# Patient Record
Sex: FEMALE | Race: OTHER | HISPANIC OR LATINO | Employment: UNEMPLOYED | URBAN - METROPOLITAN AREA
[De-identification: names, ages, dates, MRNs, and addresses within clinical notes are randomized per-mention and may not be internally consistent; named-entity substitution may affect disease eponyms.]

---

## 2019-03-25 ENCOUNTER — APPOINTMENT (EMERGENCY)
Dept: RADIOLOGY | Facility: HOSPITAL | Age: 29
End: 2019-03-25
Payer: SUBSIDIZED

## 2019-03-25 ENCOUNTER — HOSPITAL ENCOUNTER (EMERGENCY)
Facility: HOSPITAL | Age: 29
Discharge: HOME | End: 2019-03-25
Attending: EMERGENCY MEDICINE | Admitting: EMERGENCY MEDICINE
Payer: SUBSIDIZED

## 2019-03-25 VITALS
DIASTOLIC BLOOD PRESSURE: 77 MMHG | HEART RATE: 94 BPM | TEMPERATURE: 98.3 F | WEIGHT: 191 LBS | SYSTOLIC BLOOD PRESSURE: 130 MMHG | RESPIRATION RATE: 18 BRPM | OXYGEN SATURATION: 100 %

## 2019-03-25 DIAGNOSIS — R07.89 ATYPICAL CHEST PAIN: Primary | ICD-10-CM

## 2019-03-25 DIAGNOSIS — R07.89 CHEST WALL PAIN: ICD-10-CM

## 2019-03-25 LAB
ALBUMIN SERPL BCP-MCNC: 3.7 G/DL (ref 3.5–5)
ALP SERPL-CCNC: 65 U/L (ref 46–116)
ALT SERPL W P-5'-P-CCNC: 54 U/L (ref 12–78)
ANION GAP SERPL CALCULATED.3IONS-SCNC: 8 MMOL/L (ref 4–13)
AST SERPL W P-5'-P-CCNC: 40 U/L (ref 5–45)
BASOPHILS # BLD AUTO: 0.02 THOUSANDS/ΜL (ref 0–0.1)
BASOPHILS NFR BLD AUTO: 0 % (ref 0–1)
BILIRUB SERPL-MCNC: 0.4 MG/DL (ref 0.2–1)
BUN SERPL-MCNC: 18 MG/DL (ref 5–25)
CALCIUM SERPL-MCNC: 9.5 MG/DL (ref 8.3–10.1)
CHLORIDE SERPL-SCNC: 102 MMOL/L (ref 100–108)
CO2 SERPL-SCNC: 27 MMOL/L (ref 21–32)
CREAT SERPL-MCNC: 0.52 MG/DL (ref 0.6–1.3)
DEPRECATED D DIMER PPP: 270 NG/ML (FEU) (ref 190–520)
EOSINOPHIL # BLD AUTO: 0.08 THOUSAND/ΜL (ref 0–0.61)
EOSINOPHIL NFR BLD AUTO: 1 % (ref 0–6)
ERYTHROCYTE [DISTWIDTH] IN BLOOD BY AUTOMATED COUNT: 15 % (ref 11.6–15.1)
GFR SERPL CREATININE-BSD FRML MDRD: 130 ML/MIN/1.73SQ M
GLUCOSE SERPL-MCNC: 89 MG/DL (ref 65–140)
HCT VFR BLD AUTO: 40.8 % (ref 34.8–46.1)
HGB BLD-MCNC: 13.7 G/DL (ref 11.5–15.4)
LYMPHOCYTES # BLD AUTO: 3.62 THOUSANDS/ΜL (ref 0.6–4.47)
LYMPHOCYTES NFR BLD AUTO: 28 % (ref 14–44)
MCH RBC QN AUTO: 26 PG (ref 26.8–34.3)
MCHC RBC AUTO-ENTMCNC: 33.6 G/DL (ref 31.4–37.4)
MCV RBC AUTO: 78 FL (ref 82–98)
MONOCYTES # BLD AUTO: 0.76 THOUSAND/ΜL (ref 0.17–1.22)
MONOCYTES NFR BLD AUTO: 6 % (ref 4–12)
NEUTROPHILS # BLD AUTO: 8.26 THOUSANDS/ΜL (ref 1.85–7.62)
NEUTS SEG NFR BLD AUTO: 65 % (ref 43–75)
PLATELET # BLD AUTO: 412 THOUSANDS/UL (ref 149–390)
PMV BLD AUTO: 9.4 FL (ref 8.9–12.7)
POTASSIUM SERPL-SCNC: 5.4 MMOL/L (ref 3.5–5.3)
PROT SERPL-MCNC: 8.6 G/DL (ref 6.4–8.2)
RBC # BLD AUTO: 5.26 MILLION/UL (ref 3.81–5.12)
SODIUM SERPL-SCNC: 137 MMOL/L (ref 136–145)
TROPONIN I SERPL-MCNC: <0.02 NG/ML
WBC # BLD AUTO: 12.74 THOUSAND/UL (ref 4.31–10.16)

## 2019-03-25 PROCEDURE — 85379 FIBRIN DEGRADATION QUANT: CPT | Performed by: EMERGENCY MEDICINE

## 2019-03-25 PROCEDURE — 85025 COMPLETE CBC W/AUTO DIFF WBC: CPT | Performed by: EMERGENCY MEDICINE

## 2019-03-25 PROCEDURE — 71045 X-RAY EXAM CHEST 1 VIEW: CPT

## 2019-03-25 PROCEDURE — 84484 ASSAY OF TROPONIN QUANT: CPT | Performed by: EMERGENCY MEDICINE

## 2019-03-25 PROCEDURE — 93005 ELECTROCARDIOGRAM TRACING: CPT

## 2019-03-25 PROCEDURE — 99285 EMERGENCY DEPT VISIT HI MDM: CPT

## 2019-03-25 PROCEDURE — 80053 COMPREHEN METABOLIC PANEL: CPT | Performed by: EMERGENCY MEDICINE

## 2019-03-25 PROCEDURE — 36415 COLL VENOUS BLD VENIPUNCTURE: CPT | Performed by: EMERGENCY MEDICINE

## 2019-03-25 PROCEDURE — 96374 THER/PROPH/DIAG INJ IV PUSH: CPT

## 2019-03-25 RX ORDER — TRAMADOL HYDROCHLORIDE 50 MG/1
50 TABLET ORAL ONCE
Status: COMPLETED | OUTPATIENT
Start: 2019-03-25 | End: 2019-03-25

## 2019-03-25 RX ORDER — NAPROXEN 500 MG/1
500 TABLET ORAL 2 TIMES DAILY WITH MEALS
Qty: 8 TABLET | Refills: 0 | Status: SHIPPED | OUTPATIENT
Start: 2019-03-25 | End: 2020-08-18

## 2019-03-25 RX ORDER — TRAMADOL HYDROCHLORIDE 50 MG/1
TABLET ORAL
Qty: 8 TABLET | Refills: 0 | Status: SHIPPED | OUTPATIENT
Start: 2019-03-25 | End: 2020-08-18 | Stop reason: ALTCHOICE

## 2019-03-25 RX ORDER — KETOROLAC TROMETHAMINE 30 MG/ML
30 INJECTION, SOLUTION INTRAMUSCULAR; INTRAVENOUS ONCE
Status: COMPLETED | OUTPATIENT
Start: 2019-03-25 | End: 2019-03-25

## 2019-03-25 RX ADMIN — TRAMADOL HYDROCHLORIDE 50 MG: 50 TABLET, COATED ORAL at 22:54

## 2019-03-25 RX ADMIN — KETOROLAC TROMETHAMINE 30 MG: 30 INJECTION, SOLUTION INTRAMUSCULAR at 22:17

## 2019-03-26 LAB
ATRIAL RATE: 85 BPM
P AXIS: 21 DEGREES
PR INTERVAL: 154 MS
QRS AXIS: 29 DEGREES
QRSD INTERVAL: 76 MS
QT INTERVAL: 376 MS
QTC INTERVAL: 447 MS
T WAVE AXIS: 11 DEGREES
VENTRICULAR RATE: 85 BPM

## 2019-03-26 PROCEDURE — 93010 ELECTROCARDIOGRAM REPORT: CPT | Performed by: INTERNAL MEDICINE

## 2019-03-26 NOTE — ED PROVIDER NOTES
History  Chief Complaint   Patient presents with    Back Pain     Use of  line  Pain right back and right chest for a week  No injury   Sudden onset  No abdominal pain   Denies nausea/vomiting  Pain worse with movement , states she has had a lump right side of neck for 3 years , states was seen here and in her coountry and told " is like a ganglion cyst "    Chest Pain     33 yo female c/o right sided upper chest, shoulder and back pain worsening x 5 days  No cough  No fever but chest area feels "hot"  No vomiting or diarrhea  No sob  No trauma  No travel  Worse with movement  History provided by:  Patient   used: Yes        None       History reviewed  No pertinent past medical history  History reviewed  No pertinent surgical history  History reviewed  No pertinent family history  I have reviewed and agree with the history as documented  Social History     Tobacco Use    Smoking status: Former Smoker    Smokeless tobacco: Never Used   Substance Use Topics    Alcohol use: Not Currently    Drug use: Never        Review of Systems   Constitutional: Negative  Negative for fever  HENT: Negative  Eyes: Negative  Respiratory: Negative  Negative for cough and shortness of breath  Cardiovascular: Positive for chest pain  Negative for leg swelling  Gastrointestinal: Negative  Negative for abdominal pain, diarrhea, nausea and vomiting  Genitourinary: Negative  Negative for dysuria and flank pain  Musculoskeletal: Negative  Negative for back pain and myalgias  Skin: Negative  Negative for rash  Neurological: Negative  Negative for dizziness and headaches  Hematological: Does not bruise/bleed easily  Psychiatric/Behavioral: Negative  All other systems reviewed and are negative  Physical Exam  Physical Exam   Constitutional: She appears well-developed and well-nourished  No distress     HENT:   Head: Normocephalic and atraumatic  Eyes: Pupils are equal, round, and reactive to light  Conjunctivae are normal    Neck: Normal range of motion  Neck supple  Cardiovascular: Normal rate, regular rhythm and normal heart sounds  No murmur heard  Pulmonary/Chest: Effort normal and breath sounds normal  No respiratory distress  She exhibits tenderness  + diffuse right anterior upper chest and back pain and axilla area, no swollen lymph nodes   Abdominal: Soft  Bowel sounds are normal  She exhibits no distension  There is no tenderness  Musculoskeletal: Normal range of motion  She exhibits no edema or deformity  Neurological: She is alert  No cranial nerve deficit  Skin: Skin is warm and dry  No rash noted  She is not diaphoretic  No pallor  Psychiatric: She has a normal mood and affect  Her behavior is normal    Nursing note and vitals reviewed        Vital Signs  ED Triage Vitals [03/25/19 2109]   Temperature Pulse Respirations Blood Pressure SpO2   98 3 °F (36 8 °C) 94 18 130/77 100 %      Temp Source Heart Rate Source Patient Position - Orthostatic VS BP Location FiO2 (%)   Tympanic Monitor Sitting Left arm --      Pain Score       7           Vitals:    03/25/19 2109   BP: 130/77   Pulse: 94   Patient Position - Orthostatic VS: Sitting         Visual Acuity      ED Medications  Medications   ketorolac (TORADOL) injection 30 mg (30 mg Intravenous Given 3/25/19 2217)   traMADol (ULTRAM) tablet 50 mg (50 mg Oral Given 3/25/19 2254)       Diagnostic Studies  Results Reviewed     Procedure Component Value Units Date/Time    Troponin I [[id number]]  (Normal) Collected:  03/25/19 2218    Lab Status:  Final result Specimen:  Blood from Arm, Right Updated:  03/25/19 2243     Troponin I <0 02 ng/mL     Comprehensive metabolic panel [[id number]]  (Abnormal) Collected:  03/25/19 2218    Lab Status:  Final result Specimen:  Blood from Arm, Right Updated:  03/25/19 2241     Sodium 137 mmol/L      Potassium 5 4 mmol/L      Chloride 102 mmol/L      CO2 27 mmol/L      ANION GAP 8 mmol/L      BUN 18 mg/dL      Creatinine 0 52 mg/dL      Glucose 89 mg/dL      Calcium 9 5 mg/dL      AST 40 U/L      ALT 54 U/L      Alkaline Phosphatase 65 U/L      Total Protein 8 6 g/dL      Albumin 3 7 g/dL      Total Bilirubin 0 40 mg/dL      eGFR 130 ml/min/1 73sq m     Narrative:       National Kidney Disease Education Program recommendations are as follows:  GFR calculation is accurate only with a steady state creatinine  Chronic Kidney disease less than 60 ml/min/1 73 sq  meters  Kidney failure less than 15 ml/min/1 73 sq  meters      D-Dimer [[id number]]  (Normal) Collected:  03/25/19 2218    Lab Status:  Final result Specimen:  Blood from Arm, Right Updated:  03/25/19 2236     D-Dimer, Quant 270 ng/ml (FEU)     CBC and differential [[id number]]  (Abnormal) Collected:  03/25/19 2218    Lab Status:  Final result Specimen:  Blood from Arm, Right Updated:  03/25/19 2223     WBC 12 74 Thousand/uL      RBC 5 26 Million/uL      Hemoglobin 13 7 g/dL      Hematocrit 40 8 %      MCV 78 fL      MCH 26 0 pg      MCHC 33 6 g/dL      RDW 15 0 %      MPV 9 4 fL      Platelets 664 Thousands/uL      Neutrophils Relative 65 %      Lymphocytes Relative 28 %      Monocytes Relative 6 %      Eosinophils Relative 1 %      Basophils Relative 0 %      Neutrophils Absolute 8 26 Thousands/µL      Lymphocytes Absolute 3 62 Thousands/µL      Monocytes Absolute 0 76 Thousand/µL      Eosinophils Absolute 0 08 Thousand/µL      Basophils Absolute 0 02 Thousands/µL                  XR chest 1 view portable   ED Interpretation by Vinicio James MD ([id number])   NAD                 Procedures  ECG 12 Lead Documentation  Date/Time: 3/25/2019 10:11 PM  Performed by: Vinicio James MD  Authorized by: Vinicio James MD     Indications / Diagnosis:  Chest pain  ECG reviewed by me, the ED Provider: yes    Patient location:  ED  Previous ECG:     Previous ECG:  Unavailable  Interpretation: Interpretation: normal    Rate:     ECG rate:  85    ECG rate assessment: normal    Rhythm:     Rhythm: sinus rhythm    Ectopy:     Ectopy: none    QRS:     QRS axis:  Normal  Conduction:     Conduction: normal    ST segments:     ST segments:  Normal  T waves:     T waves: normal             Phone Contacts  ED Phone Contact    ED Course               PERC Rule for PE      Most Recent Value   PERC Rule for PE   Age >=50  0 Filed at: 03/25/2019 2248   HR >=100  0 Filed at: 03/25/2019 2248   O2 Sat on room air < 95%  0 Filed at: 03/25/2019 2248   History of PE or DVT  0 Filed at: 03/25/2019 2248   Recent trauma or surgery  0 Filed at: 03/25/2019 2248   Hemoptysis  0 Filed at: 03/25/2019 2248   Exogenous estrogen  0 Filed at: 03/25/2019 2248   Unilateral leg swelling  0 Filed at: 03/25/2019 2248   PERC Rule for PE Results  0 Filed at: 03/25/2019 2248                Wells' Criteria for PE      Most Recent Value   Wells' Criteria for PE   Clinical signs and symptoms of DVT  0 Filed at: 03/25/2019 2248   PE is primary diagnosis or equally likely  0 Filed at: 03/25/2019 2248   HR >100  0 Filed at: 03/25/2019 2248   Immobilization at least 3 days or Surgery in the previous 4 weeks  0 Filed at: 03/25/2019 2248   Previous, objectively diagnosed PE or DVT  0 Filed at: 03/25/2019 2248   Hemoptysis  0 Filed at: 03/25/2019 2248   Malignancy with treatment within 6 months or palliative  0 Filed at: 03/25/2019 2248   Wells' Criteria Total  0 Filed at: 03/25/2019 2248            [facility]  Number of Diagnoses or Management Options  Atypical chest pain:   Chest wall pain:   Diagnosis management comments: Evaluation benign, seems musculoskeletal so will treat pain and advised rest, heating pad, follow up outpt        Disposition  Final diagnoses:   Atypical chest pain   Chest wall pain     Time reflects when diagnosis was documented in both MDM as applicable and the Disposition within this note     Time User Action Codes Description Comment 7/76/9842 87:69 PM Shawna TORRES Add [B28 01] Atypical chest pain     8/98/4299 46:05 PM Shawna TORRES Add [J46 40] Chest wall pain       ED Disposition     ED Disposition Condition Date/Time Comment    Discharge Stable Mon Mar 25, 2019 10:49 PM Jimy Mi discharge to home/self care  Follow-up Information     Follow up With Specialties Details Why Contact Info    Gavino Guzman MD Family Medicine Schedule an appointment as soon as possible for a visit   [address] [place] [address]  [facility] [phone]            Patient's Medications   Discharge Prescriptions    NAPROXEN (NAPROSYN) 500 MG TABLET    Take 1 tablet (500 mg total) by mouth 2 (two) times a day with meals       Start Date: 3/25/2019 End Date: --       Order Dose: 500 mg       Quantity: 8 tablet    Refills: 0    TRAMADOL (ULTRAM) 50 MG TABLET    1 q 6 hours prn pain       Start Date: 3/25/2019 End Date: --       Order Dose: --       Quantity: 8 tablet    Refills: 0     No discharge procedures on file      ED Provider  Electronically Signed by           Khris Hong MD  29/08/28 [id number]

## 2019-04-16 ENCOUNTER — OFFICE VISIT (OUTPATIENT)
Dept: INTERNAL MEDICINE CLINIC | Facility: CLINIC | Age: 29
End: 2019-04-16

## 2019-04-16 VITALS
HEART RATE: 80 BPM | HEIGHT: 61 IN | SYSTOLIC BLOOD PRESSURE: 110 MMHG | TEMPERATURE: 98.2 F | BODY MASS INDEX: 36.46 KG/M2 | WEIGHT: 193.12 LBS | DIASTOLIC BLOOD PRESSURE: 80 MMHG

## 2019-04-16 DIAGNOSIS — E78.00 PURE HYPERCHOLESTEROLEMIA: Primary | Chronic | ICD-10-CM

## 2019-04-16 DIAGNOSIS — Z3A.01 LESS THAN 8 WEEKS GESTATION OF PREGNANCY: ICD-10-CM

## 2019-04-16 DIAGNOSIS — E66.09 CLASS 2 OBESITY DUE TO EXCESS CALORIES WITHOUT SERIOUS COMORBIDITY WITH BODY MASS INDEX (BMI) OF 35.0 TO 35.9 IN ADULT: Chronic | ICD-10-CM

## 2019-04-16 DIAGNOSIS — Z23 NEED FOR INFLUENZA VACCINATION: ICD-10-CM

## 2019-04-16 DIAGNOSIS — R79.89 ABNORMAL THYROID BLOOD TEST: Chronic | ICD-10-CM

## 2019-04-16 DIAGNOSIS — G25.2 FINE TREMOR: Chronic | ICD-10-CM

## 2019-04-16 PROCEDURE — 99203 OFFICE O/P NEW LOW 30 MIN: CPT | Performed by: INTERNAL MEDICINE

## 2019-04-16 PROCEDURE — 90682 RIV4 VACC RECOMBINANT DNA IM: CPT | Performed by: INTERNAL MEDICINE

## 2019-04-16 PROCEDURE — 90471 IMMUNIZATION ADMIN: CPT | Performed by: INTERNAL MEDICINE

## 2019-05-02 ENCOUNTER — INITIAL PRENATAL (OUTPATIENT)
Dept: OBGYN CLINIC | Facility: CLINIC | Age: 29
End: 2019-05-02

## 2019-05-02 ENCOUNTER — TRANSCRIBE ORDERS (OUTPATIENT)
Dept: ADMINISTRATIVE | Facility: HOSPITAL | Age: 29
End: 2019-05-02

## 2019-05-02 ENCOUNTER — APPOINTMENT (OUTPATIENT)
Dept: LAB | Facility: HOSPITAL | Age: 29
End: 2019-05-02
Payer: SUBSIDIZED

## 2019-05-02 VITALS
BODY MASS INDEX: 37.19 KG/M2 | HEIGHT: 61 IN | DIASTOLIC BLOOD PRESSURE: 76 MMHG | HEART RATE: 92 BPM | WEIGHT: 197 LBS | SYSTOLIC BLOOD PRESSURE: 108 MMHG

## 2019-05-02 DIAGNOSIS — Z34.91 PRENATAL CARE IN FIRST TRIMESTER: ICD-10-CM

## 2019-05-02 DIAGNOSIS — E78.00 PURE HYPERCHOLESTEROLEMIA: ICD-10-CM

## 2019-05-02 DIAGNOSIS — E78.00 PURE HYPERCHOLESTEROLEMIA: Primary | ICD-10-CM

## 2019-05-02 DIAGNOSIS — Z34.91 PRENATAL CARE IN FIRST TRIMESTER: Primary | ICD-10-CM

## 2019-05-02 LAB
ABO GROUP BLD: NORMAL
ALBUMIN SERPL BCP-MCNC: 3.6 G/DL (ref 3.5–5)
ALP SERPL-CCNC: 52 U/L (ref 46–116)
ALT SERPL W P-5'-P-CCNC: 46 U/L (ref 12–78)
ANION GAP SERPL CALCULATED.3IONS-SCNC: 11 MMOL/L (ref 4–13)
AST SERPL W P-5'-P-CCNC: 23 U/L (ref 5–45)
BACTERIA UR QL AUTO: ABNORMAL /HPF
BASOPHILS # BLD AUTO: 0.03 THOUSANDS/ΜL (ref 0–0.1)
BASOPHILS NFR BLD AUTO: 0 % (ref 0–1)
BILIRUB SERPL-MCNC: 0.2 MG/DL (ref 0.2–1)
BILIRUB UR QL STRIP: NEGATIVE
BLD GP AB SCN SERPL QL: NEGATIVE
BUN SERPL-MCNC: 11 MG/DL (ref 5–25)
CALCIUM SERPL-MCNC: 9.1 MG/DL (ref 8.3–10.1)
CHLORIDE SERPL-SCNC: 101 MMOL/L (ref 100–108)
CLARITY UR: ABNORMAL
CO2 SERPL-SCNC: 25 MMOL/L (ref 21–32)
COLOR UR: YELLOW
CREAT SERPL-MCNC: 0.61 MG/DL (ref 0.6–1.3)
EOSINOPHIL # BLD AUTO: 0.06 THOUSAND/ΜL (ref 0–0.61)
EOSINOPHIL NFR BLD AUTO: 1 % (ref 0–6)
ERYTHROCYTE [DISTWIDTH] IN BLOOD BY AUTOMATED COUNT: 14.6 % (ref 11.6–15.1)
GFR SERPL CREATININE-BSD FRML MDRD: 123 ML/MIN/1.73SQ M
GLUCOSE 1H P 50 G GLC PO SERPL-MCNC: 146 MG/DL
GLUCOSE P FAST SERPL-MCNC: 95 MG/DL (ref 65–99)
GLUCOSE UR STRIP-MCNC: NEGATIVE MG/DL
HBV SURFACE AG SER QL: NORMAL
HCT VFR BLD AUTO: 41.8 % (ref 34.8–46.1)
HGB BLD-MCNC: 13.2 G/DL (ref 11.5–15.4)
HGB UR QL STRIP.AUTO: ABNORMAL
IMM GRANULOCYTES # BLD AUTO: 0.08 THOUSAND/UL (ref 0–0.2)
IMM GRANULOCYTES NFR BLD AUTO: 1 % (ref 0–2)
KETONES UR STRIP-MCNC: NEGATIVE MG/DL
LEUKOCYTE ESTERASE UR QL STRIP: ABNORMAL
LYMPHOCYTES # BLD AUTO: 2.12 THOUSANDS/ΜL (ref 0.6–4.47)
LYMPHOCYTES NFR BLD AUTO: 23 % (ref 14–44)
MCH RBC QN AUTO: 25.8 PG (ref 26.8–34.3)
MCHC RBC AUTO-ENTMCNC: 31.6 G/DL (ref 31.4–37.4)
MCV RBC AUTO: 82 FL (ref 82–98)
MONOCYTES # BLD AUTO: 0.46 THOUSAND/ΜL (ref 0.17–1.22)
MONOCYTES NFR BLD AUTO: 5 % (ref 4–12)
NEUTROPHILS # BLD AUTO: 6.37 THOUSANDS/ΜL (ref 1.85–7.62)
NEUTS SEG NFR BLD AUTO: 70 % (ref 43–75)
NITRITE UR QL STRIP: NEGATIVE
NON-SQ EPI CELLS URNS QL MICRO: ABNORMAL /HPF
NRBC BLD AUTO-RTO: 0 /100 WBCS
PH UR STRIP.AUTO: 6.5 [PH]
PLATELET # BLD AUTO: 392 THOUSANDS/UL (ref 149–390)
PMV BLD AUTO: 9 FL (ref 8.9–12.7)
POTASSIUM SERPL-SCNC: 3.8 MMOL/L (ref 3.5–5.3)
PROT SERPL-MCNC: 7.9 G/DL (ref 6.4–8.2)
PROT UR STRIP-MCNC: NEGATIVE MG/DL
RBC # BLD AUTO: 5.11 MILLION/UL (ref 3.81–5.12)
RBC #/AREA URNS AUTO: ABNORMAL /HPF
RH BLD: POSITIVE
RPR SER QL: NORMAL
RUBV IGG SERPL IA-ACNC: 37.1 IU/ML
SODIUM SERPL-SCNC: 137 MMOL/L (ref 136–145)
SP GR UR STRIP.AUTO: 1.01 (ref 1–1.03)
SPECIMEN EXPIRATION DATE: NORMAL
T4 FREE SERPL-MCNC: 1.02 NG/DL (ref 0.76–1.46)
TSH SERPL DL<=0.05 MIU/L-ACNC: 2.9 UIU/ML (ref 0.36–3.74)
UROBILINOGEN UR QL STRIP.AUTO: 0.2 E.U./DL
WBC # BLD AUTO: 9.12 THOUSAND/UL (ref 4.31–10.16)
WBC #/AREA URNS AUTO: ABNORMAL /HPF

## 2019-05-02 PROCEDURE — 36415 COLL VENOUS BLD VENIPUNCTURE: CPT | Performed by: NURSE PRACTITIONER

## 2019-05-02 PROCEDURE — 84439 ASSAY OF FREE THYROXINE: CPT

## 2019-05-02 PROCEDURE — 82950 GLUCOSE TEST: CPT

## 2019-05-02 PROCEDURE — 81001 URINALYSIS AUTO W/SCOPE: CPT

## 2019-05-02 PROCEDURE — 80081 OBSTETRIC PANEL INC HIV TSTG: CPT

## 2019-05-02 PROCEDURE — 80053 COMPREHEN METABOLIC PANEL: CPT | Performed by: NURSE PRACTITIONER

## 2019-05-02 PROCEDURE — 99211 OFF/OP EST MAY X REQ PHY/QHP: CPT

## 2019-05-02 PROCEDURE — 84443 ASSAY THYROID STIM HORMONE: CPT

## 2019-05-02 PROCEDURE — 87086 URINE CULTURE/COLONY COUNT: CPT

## 2019-05-03 ENCOUNTER — TELEPHONE (OUTPATIENT)
Dept: OBGYN CLINIC | Facility: CLINIC | Age: 29
End: 2019-05-03

## 2019-05-03 DIAGNOSIS — R73.09 ABNORMAL GTT (GLUCOSE TOLERANCE TEST): Primary | ICD-10-CM

## 2019-05-03 LAB — HIV 1+2 AB+HIV1 P24 AG SERPL QL IA: NORMAL

## 2019-05-04 LAB — BACTERIA UR CULT: NORMAL

## 2019-05-06 ENCOUNTER — TRANSCRIBE ORDERS (OUTPATIENT)
Dept: ADMINISTRATIVE | Facility: HOSPITAL | Age: 29
End: 2019-05-06

## 2019-05-06 ENCOUNTER — APPOINTMENT (OUTPATIENT)
Dept: LAB | Facility: HOSPITAL | Age: 29
End: 2019-05-06
Payer: SUBSIDIZED

## 2019-05-06 DIAGNOSIS — R73.09 ABNORMAL GTT (GLUCOSE TOLERANCE TEST): Primary | ICD-10-CM

## 2019-05-06 DIAGNOSIS — R73.09 ABNORMAL GTT (GLUCOSE TOLERANCE TEST): ICD-10-CM

## 2019-05-06 LAB — GLUCOSE P FAST SERPL-MCNC: 103 MG/DL (ref 65–99)

## 2019-05-06 PROCEDURE — 36415 COLL VENOUS BLD VENIPUNCTURE: CPT

## 2019-05-06 PROCEDURE — 82951 GLUCOSE TOLERANCE TEST (GTT): CPT

## 2019-05-09 ENCOUNTER — APPOINTMENT (OUTPATIENT)
Dept: LAB | Facility: HOSPITAL | Age: 29
End: 2019-05-09
Payer: COMMERCIAL

## 2019-05-09 ENCOUNTER — ROUTINE PRENATAL (OUTPATIENT)
Dept: OBGYN CLINIC | Facility: CLINIC | Age: 29
End: 2019-05-09

## 2019-05-09 VITALS
DIASTOLIC BLOOD PRESSURE: 84 MMHG | HEART RATE: 91 BPM | WEIGHT: 198 LBS | SYSTOLIC BLOOD PRESSURE: 126 MMHG | BODY MASS INDEX: 37.41 KG/M2

## 2019-05-09 DIAGNOSIS — Z34.91 PRENATAL CARE IN FIRST TRIMESTER: ICD-10-CM

## 2019-05-09 DIAGNOSIS — O36.80X0 PREGNANCY OF UNKNOWN ANATOMIC LOCATION: Primary | ICD-10-CM

## 2019-05-09 DIAGNOSIS — Z11.3 ROUTINE SCREENING FOR STI (SEXUALLY TRANSMITTED INFECTION): ICD-10-CM

## 2019-05-09 DIAGNOSIS — Z32.01 POSITIVE PREGNANCY TEST: ICD-10-CM

## 2019-05-09 DIAGNOSIS — Z34.91 FIRST TRIMESTER PREGNANCY: ICD-10-CM

## 2019-05-09 LAB — B-HCG SERPL-ACNC: 5236 MIU/ML

## 2019-05-09 PROCEDURE — 36415 COLL VENOUS BLD VENIPUNCTURE: CPT

## 2019-05-09 PROCEDURE — 99213 OFFICE O/P EST LOW 20 MIN: CPT | Performed by: OBSTETRICS & GYNECOLOGY

## 2019-05-09 PROCEDURE — G0145 SCR C/V CYTO,THINLAYER,RESCR: HCPCS | Performed by: OBSTETRICS & GYNECOLOGY

## 2019-05-09 PROCEDURE — 87491 CHLMYD TRACH DNA AMP PROBE: CPT | Performed by: OBSTETRICS & GYNECOLOGY

## 2019-05-09 PROCEDURE — 87591 N.GONORRHOEAE DNA AMP PROB: CPT | Performed by: OBSTETRICS & GYNECOLOGY

## 2019-05-09 PROCEDURE — 84702 CHORIONIC GONADOTROPIN TEST: CPT

## 2019-05-10 ENCOUNTER — OFFICE VISIT (OUTPATIENT)
Dept: PERINATAL CARE | Facility: CLINIC | Age: 29
End: 2019-05-10

## 2019-05-10 VITALS
SYSTOLIC BLOOD PRESSURE: 123 MMHG | HEART RATE: 85 BPM | BODY MASS INDEX: 37.49 KG/M2 | HEIGHT: 61 IN | DIASTOLIC BLOOD PRESSURE: 81 MMHG | WEIGHT: 198.6 LBS

## 2019-05-10 DIAGNOSIS — Z3A.10 10 WEEKS GESTATION OF PREGNANCY: ICD-10-CM

## 2019-05-10 DIAGNOSIS — O99.211 OBESITY AFFECTING PREGNANCY IN FIRST TRIMESTER: ICD-10-CM

## 2019-05-10 DIAGNOSIS — O24.419 GESTATIONAL DIABETES MELLITUS (GDM) IN FIRST TRIMESTER, GESTATIONAL DIABETES METHOD OF CONTROL UNSPECIFIED: Primary | ICD-10-CM

## 2019-05-10 DIAGNOSIS — R73.09 ABNORMAL GTT (GLUCOSE TOLERANCE TEST): ICD-10-CM

## 2019-05-10 LAB
C TRACH DNA SPEC QL NAA+PROBE: NEGATIVE
N GONORRHOEA DNA SPEC QL NAA+PROBE: NEGATIVE

## 2019-05-10 PROCEDURE — G0108 DIAB MANAGE TRN  PER INDIV: HCPCS

## 2019-05-10 RX ORDER — BLOOD SUGAR DIAGNOSTIC
STRIP MISCELLANEOUS
Qty: 100 EACH | Refills: 6 | Status: SHIPPED | OUTPATIENT
Start: 2019-05-10 | End: 2020-08-18 | Stop reason: ALTCHOICE

## 2019-05-10 RX ORDER — LANCETS
EACH MISCELLANEOUS
Qty: 102 EACH | Refills: 6 | Status: SHIPPED | OUTPATIENT
Start: 2019-05-10 | End: 2020-08-18 | Stop reason: ALTCHOICE

## 2019-05-11 ENCOUNTER — TRANSCRIBE ORDERS (OUTPATIENT)
Dept: ADMINISTRATIVE | Facility: HOSPITAL | Age: 29
End: 2019-05-11

## 2019-05-11 ENCOUNTER — LAB (OUTPATIENT)
Dept: LAB | Facility: HOSPITAL | Age: 29
End: 2019-05-11
Payer: SUBSIDIZED

## 2019-05-11 DIAGNOSIS — O36.80X0 PREGNANCY OF UNKNOWN ANATOMIC LOCATION: ICD-10-CM

## 2019-05-11 DIAGNOSIS — O24.419 GESTATIONAL DIABETES MELLITUS (GDM) IN FIRST TRIMESTER, GESTATIONAL DIABETES METHOD OF CONTROL UNSPECIFIED: ICD-10-CM

## 2019-05-11 DIAGNOSIS — Z32.01 PREGNANCY EXAMINATION OR TEST, POSITIVE RESULT: Primary | ICD-10-CM

## 2019-05-11 LAB
B-HCG SERPL-ACNC: 3947 MIU/ML
CHOLEST SERPL-MCNC: 208 MG/DL (ref 50–200)
EST. AVERAGE GLUCOSE BLD GHB EST-MCNC: 131 MG/DL
HBA1C MFR BLD: 6.2 % (ref 4.2–6.3)
HDLC SERPL-MCNC: 40 MG/DL (ref 40–60)
LDLC SERPL CALC-MCNC: 142 MG/DL (ref 0–100)
TRIGL SERPL-MCNC: 129 MG/DL

## 2019-05-11 PROCEDURE — 36415 COLL VENOUS BLD VENIPUNCTURE: CPT

## 2019-05-11 PROCEDURE — 84702 CHORIONIC GONADOTROPIN TEST: CPT

## 2019-05-11 PROCEDURE — 83036 HEMOGLOBIN GLYCOSYLATED A1C: CPT

## 2019-05-11 PROCEDURE — 80061 LIPID PANEL: CPT | Performed by: PHYSICIAN ASSISTANT

## 2019-05-13 ENCOUNTER — TELEPHONE (OUTPATIENT)
Dept: OBGYN CLINIC | Facility: CLINIC | Age: 29
End: 2019-05-13

## 2019-05-13 ENCOUNTER — DOCUMENTATION (OUTPATIENT)
Dept: PERINATAL CARE | Facility: CLINIC | Age: 29
End: 2019-05-13

## 2019-05-14 ENCOUNTER — OFFICE VISIT (OUTPATIENT)
Dept: PERINATAL CARE | Facility: CLINIC | Age: 29
End: 2019-05-14

## 2019-05-14 VITALS
SYSTOLIC BLOOD PRESSURE: 134 MMHG | HEART RATE: 118 BPM | BODY MASS INDEX: 37.12 KG/M2 | DIASTOLIC BLOOD PRESSURE: 87 MMHG | HEIGHT: 61 IN | WEIGHT: 196.6 LBS

## 2019-05-14 DIAGNOSIS — Z3A.10 10 WEEKS GESTATION OF PREGNANCY: ICD-10-CM

## 2019-05-14 DIAGNOSIS — R73.09 ABNORMAL GLUCOSE: ICD-10-CM

## 2019-05-14 DIAGNOSIS — R73.09 ELEVATED HEMOGLOBIN A1C: ICD-10-CM

## 2019-05-14 DIAGNOSIS — R73.03 PRE-DIABETES: Primary | ICD-10-CM

## 2019-05-14 PROBLEM — E66.09 CLASS 2 OBESITY DUE TO EXCESS CALORIES WITHOUT SERIOUS COMORBIDITY WITH BODY MASS INDEX (BMI) OF 36.0 TO 36.9 IN ADULT: Status: ACTIVE | Noted: 2019-04-16

## 2019-05-15 ENCOUNTER — HOSPITAL ENCOUNTER (EMERGENCY)
Facility: HOSPITAL | Age: 29
Discharge: HOME | End: 2019-05-16
Attending: EMERGENCY MEDICINE | Admitting: EMERGENCY MEDICINE
Payer: SUBSIDIZED

## 2019-05-15 DIAGNOSIS — O20.0 THREATENED ABORTION: Primary | ICD-10-CM

## 2019-05-15 LAB
LAB AP GYN PRIMARY INTERPRETATION: NORMAL
LAB AP LMP: NORMAL
Lab: NORMAL

## 2019-05-15 PROCEDURE — 99284 EMERGENCY DEPT VISIT MOD MDM: CPT

## 2019-05-16 ENCOUNTER — OFFICE VISIT (OUTPATIENT)
Dept: OBGYN CLINIC | Facility: CLINIC | Age: 29
End: 2019-05-16

## 2019-05-16 ENCOUNTER — APPOINTMENT (EMERGENCY)
Dept: RADIOLOGY | Facility: HOSPITAL | Age: 29
End: 2019-05-16
Payer: SUBSIDIZED

## 2019-05-16 ENCOUNTER — TELEPHONE (OUTPATIENT)
Dept: OTHER | Facility: OTHER | Age: 29
End: 2019-05-16

## 2019-05-16 VITALS
RESPIRATION RATE: 17 BRPM | OXYGEN SATURATION: 99 % | TEMPERATURE: 98.6 F | HEART RATE: 85 BPM | SYSTOLIC BLOOD PRESSURE: 112 MMHG | DIASTOLIC BLOOD PRESSURE: 56 MMHG

## 2019-05-16 VITALS
DIASTOLIC BLOOD PRESSURE: 86 MMHG | RESPIRATION RATE: 16 BRPM | BODY MASS INDEX: 36.81 KG/M2 | HEART RATE: 76 BPM | SYSTOLIC BLOOD PRESSURE: 118 MMHG | WEIGHT: 194.8 LBS

## 2019-05-16 DIAGNOSIS — O02.89 NON-VIABLE PREGNANCY: Primary | ICD-10-CM

## 2019-05-16 LAB — B-HCG SERPL-ACNC: 2184 MIU/ML

## 2019-05-16 PROCEDURE — 36415 COLL VENOUS BLD VENIPUNCTURE: CPT | Performed by: EMERGENCY MEDICINE

## 2019-05-16 PROCEDURE — 76815 OB US LIMITED FETUS(S): CPT

## 2019-05-16 PROCEDURE — 84702 CHORIONIC GONADOTROPIN TEST: CPT | Performed by: EMERGENCY MEDICINE

## 2019-05-16 NOTE — ASSESSMENT & PLAN NOTE
marcecom  #[id number]    Discussed with patient continued decline of beta HCG levels in past week  - [facility] [phone] -> [phone] -> [phone]     - recommended for patient to have weekly quants to follow HCG down to 0  Explained to patient that while the ED ultrasound noted 2 gestational sacs, the decline in her beta HCG levels denote that she is likely undergoing a spontaneous   Explained that she will likely undergo more increased bleeding than she has already had  Patient provided with weekly HCG quant lab orders for the next 2 weeks to monitor her HCG level  Discussed with patient that we would recommend that she and her  utilize a barrier contraception to prevent pregnancy while we are monitoring her HCG level   Explained that majority of SABs in the first trimester occur secondary to

## 2019-05-23 ENCOUNTER — APPOINTMENT (OUTPATIENT)
Dept: LAB | Facility: HOSPITAL | Age: 29
End: 2019-05-23
Attending: EMERGENCY MEDICINE
Payer: SUBSIDIZED

## 2019-05-23 ENCOUNTER — TRANSCRIBE ORDERS (OUTPATIENT)
Dept: ADMINISTRATIVE | Facility: HOSPITAL | Age: 29
End: 2019-05-23

## 2019-05-23 DIAGNOSIS — O20.0 THREATENED ABORTION: ICD-10-CM

## 2019-05-23 DIAGNOSIS — Z32.01 PREGNANCY EXAMINATION OR TEST, POSITIVE RESULT: Primary | ICD-10-CM

## 2019-05-23 LAB — B-HCG SERPL-ACNC: 25 MIU/ML

## 2019-05-23 PROCEDURE — 84702 CHORIONIC GONADOTROPIN TEST: CPT | Performed by: OBSTETRICS & GYNECOLOGY

## 2019-05-23 PROCEDURE — 36415 COLL VENOUS BLD VENIPUNCTURE: CPT | Performed by: OBSTETRICS & GYNECOLOGY

## 2019-05-28 DIAGNOSIS — O02.89 NON-VIABLE PREGNANCY: Primary | ICD-10-CM

## 2019-05-31 ENCOUNTER — LAB (OUTPATIENT)
Dept: LAB | Facility: HOSPITAL | Age: 29
End: 2019-05-31
Payer: SUBSIDIZED

## 2019-05-31 DIAGNOSIS — O02.89 NON-VIABLE PREGNANCY: ICD-10-CM

## 2019-05-31 DIAGNOSIS — Z32.01 PREGNANCY EXAMINATION OR TEST, POSITIVE RESULT: ICD-10-CM

## 2019-05-31 LAB — B-HCG SERPL-ACNC: 2 MIU/ML

## 2019-05-31 PROCEDURE — 84702 CHORIONIC GONADOTROPIN TEST: CPT

## 2019-05-31 PROCEDURE — 36415 COLL VENOUS BLD VENIPUNCTURE: CPT

## 2019-06-06 ENCOUNTER — LAB (OUTPATIENT)
Dept: LAB | Facility: HOSPITAL | Age: 29
End: 2019-06-06
Payer: SUBSIDIZED

## 2019-06-06 DIAGNOSIS — Z32.01 PREGNANCY EXAMINATION OR TEST, POSITIVE RESULT: ICD-10-CM

## 2019-06-06 LAB — B-HCG SERPL-ACNC: <2 MIU/ML

## 2019-06-06 PROCEDURE — 84702 CHORIONIC GONADOTROPIN TEST: CPT

## 2019-06-06 PROCEDURE — 36415 COLL VENOUS BLD VENIPUNCTURE: CPT

## 2019-06-07 ENCOUNTER — TELEPHONE (OUTPATIENT)
Dept: OBGYN CLINIC | Facility: CLINIC | Age: 29
End: 2019-06-07

## 2019-08-09 ENCOUNTER — OFFICE VISIT (OUTPATIENT)
Dept: OBGYN CLINIC | Facility: CLINIC | Age: 29
End: 2019-08-09

## 2019-08-09 VITALS
HEART RATE: 96 BPM | RESPIRATION RATE: 16 BRPM | BODY MASS INDEX: 38.62 KG/M2 | SYSTOLIC BLOOD PRESSURE: 119 MMHG | WEIGHT: 204.4 LBS | DIASTOLIC BLOOD PRESSURE: 86 MMHG

## 2019-08-09 DIAGNOSIS — Z31.69 INFERTILITY COUNSELING: ICD-10-CM

## 2019-08-09 DIAGNOSIS — E66.09 CLASS 2 OBESITY DUE TO EXCESS CALORIES WITHOUT SERIOUS COMORBIDITY WITH BODY MASS INDEX (BMI) OF 36.0 TO 36.9 IN ADULT: ICD-10-CM

## 2019-08-09 DIAGNOSIS — R21 RASH OF GENITAL AREA: ICD-10-CM

## 2019-08-09 DIAGNOSIS — O03.9 MISCARRIAGE: ICD-10-CM

## 2019-08-09 DIAGNOSIS — N92.6 MISSED PERIOD: Primary | ICD-10-CM

## 2019-08-09 LAB — SL AMB POCT URINE HCG: NEGATIVE

## 2019-08-09 PROCEDURE — 81025 URINE PREGNANCY TEST: CPT | Performed by: OBSTETRICS & GYNECOLOGY

## 2019-08-09 PROCEDURE — 99213 OFFICE O/P EST LOW 20 MIN: CPT | Performed by: OBSTETRICS & GYNECOLOGY

## 2019-08-09 NOTE — ASSESSMENT & PLAN NOTE
Pt here s/p miscarriage on 5/16 after 2 5 months of pregnancy  LMP - 6/21   Pt desires to have children

## 2019-08-09 NOTE — PROGRESS NOTES
Lynsey DelgadomikezaReyes 1990 female MRN: [id number]    Family Medicine Follow-up Visit    ASSESSMENT/PLAN  Problem List Items Addressed This Visit        Musculoskeletal and Integument    Rash of genital area     Likely due to "razor bumps"    - Pt advised to avoid shaving            Other    Class 2 obesity due to excess calories without serious comorbidity with body mass index (BMI) of 36 0 to 36 9 in adult     BMI Counseling: Body mass index is 38 62 kg/m²  Discussed the patient's BMI with her  The BMI is above average  BMI counseling and education was provided to the patient  Nutrition recommendations include reducing portion sizes, decreasing overall calorie intake and 3-5 servings of fruits/vegetables daily  Exercise recommendations include moderate aerobic physical activity for 150 minutes/week, vigorous aerobic physical activity for 75 minutes/week and exercising 3-5 times per week  Miscarriage     Pt here s/p miscarriage on  after 2 5 months of pregnancy  LMP -    Pt desires to have children  Missed period - Primary    Relevant Orders    POCT urine HCG (Completed)    Infertility counseling     Pt  for 10 years and lost only pregnancy in may this year  Concerned about not getting pregnant as desired  Pt reports having regular periods, except missed period in May  - Pt reassured   - Pt advised to use the ovulation lobito to determine when she ovulates  - Discussed available workup for both female (ovarian, tubal and mixed) and male causes of infertility  No future appointments  SUBJECTIVE  CC: Follow-up      HPI:  Cy Méndez is a 34 y o  female who presents to discuss pregnancy after miscarriage  Pt is  had a miscarriage on  after 2 months of pregnancy  Patient is  for 10 years,  has no children of his own either    LMP , however UPT today is negative  Pt also reports frequent " bumps" at the suprapubic extending to the vulvovaginal area    Menstrual history:  Menarche at 13 years  Periods are regular last 4-5 days and  Heavy  Medication history:  None  Social history:   Denies alcohol, illicit drug or tobacco use        Review of Systems   Constitutional: Negative for chills and fever  HENT: Negative for congestion  Eyes: Negative for visual disturbance  Respiratory: Negative for shortness of breath and wheezing  Gastrointestinal: Negative for abdominal pain, nausea and vomiting  Genitourinary: Negative for dyspareunia, dysuria, menstrual problem, vaginal bleeding, vaginal discharge and vaginal pain  Musculoskeletal: Negative for arthralgias  Skin: Positive for rash  Neurological: Negative for light-headedness and headaches  Historical Information   The patient history was reviewed as follows:    Past Medical History:   Diagnosis Date    Class 2 obesity due to excess calories without serious comorbidity with body mass index (BMI) of 35 0 to 35 9 in adult 4/16/2019     Past Surgical History:   Procedure Laterality Date    CYST REMOVAL      Vaginal area     Family History   Problem Relation Age of Onset    No Known Problems Mother     No Known Problems Father     No Known Problems Sister     No Known Problems Brother     No Known Problems Brother     No Known Problems Brother     Hyperlipidemia Maternal Grandmother     Hypertension Maternal Aunt       Social History   Social History     Substance and Sexual Activity   Alcohol Use Never    Frequency: Never     Social History     Substance and Sexual Activity   Drug Use Never     Social History     Tobacco Use   Smoking Status Never Smoker   Smokeless Tobacco Never Used       Medications:     Current Outpatient Medications:     ACCU-CHEK FASTCLIX LANCETS MISC, Test 4 times per day  (Patient not taking: Reported on 8/9/2019), Disp: 102 each, Rfl: 6    ACCU-CHEK GUIDE test strip, Test 4 times per day   (Patient not taking: Reported on 8/9/2019), Disp: 100 each, Rfl: 6    naproxen (NAPROSYN) 500 mg tablet, Take 1 tablet (500 mg total) by mouth 2 (two) times a day with meals (Patient not taking: Reported on 5/9/2019), Disp: 8 tablet, Rfl: 0    Prenatal Vit-Fe Fumarate-FA (PRENATAL 1+1 PO), Take by mouth, Disp: , Rfl:     traMADol (ULTRAM) 50 mg tablet, 1 q 6 hours prn pain (Patient not taking: Reported on 5/9/2019), Disp: 8 tablet, Rfl: 0  No Known Allergies    OBJECTIVE    Vitals:   Vitals:    08/09/19 0916   BP: 119/86   BP Location: Left arm   Patient Position: Sitting   Cuff Size: Large   Pulse: 96   Resp: 16   Weight: 92 7 kg (204 lb 6 4 oz)           Physical Exam   Constitutional: She appears well-developed and well-nourished  HENT:   Head: Normocephalic and atraumatic  Eyes: Conjunctivae are normal    Neck: Normal range of motion  Cardiovascular: Normal rate, regular rhythm and normal heart sounds  Pulmonary/Chest: Breath sounds normal    Abdominal: Bowel sounds are normal  There is no tenderness  Genitourinary:         Musculoskeletal: Normal range of motion  Neurological: She is alert  Skin: Skin is warm and dry  Psychiatric: She has a normal mood and affect  Vitals reviewed                   Cassidy Valencia MD, PGY-1  [facility]   8/9/2019

## 2019-08-09 NOTE — ASSESSMENT & PLAN NOTE
BMI Counseling: Body mass index is 38 62 kg/m²  Discussed the patient's BMI with her  The BMI is above average  BMI counseling and education was provided to the patient  Nutrition recommendations include reducing portion sizes, decreasing overall calorie intake and 3-5 servings of fruits/vegetables daily  Exercise recommendations include moderate aerobic physical activity for 150 minutes/week, vigorous aerobic physical activity for 75 minutes/week and exercising 3-5 times per week

## 2019-08-09 NOTE — ASSESSMENT & PLAN NOTE
Pt  for 10 years and lost only pregnancy in may this year  Concerned about not getting pregnant as desired  Pt reports having regular periods, except missed period in May  - Pt reassured   - Pt advised to use the ovulation lobito to determine when she ovulates  - Discussed available workup for both female (ovarian, tubal and mixed) and male causes of infertility

## 2019-12-31 ENCOUNTER — OFFICE VISIT (OUTPATIENT)
Dept: OBGYN CLINIC | Facility: CLINIC | Age: 29
End: 2019-12-31

## 2019-12-31 DIAGNOSIS — Z32.01 POSITIVE PREGNANCY TEST: Primary | ICD-10-CM

## 2019-12-31 PROCEDURE — 99213 OFFICE O/P EST LOW 20 MIN: CPT | Performed by: NURSE PRACTITIONER

## 2019-12-31 RX ORDER — PNV NO.95/FERROUS FUM/FOLIC AC 28MG-0.8MG
1 TABLET ORAL DAILY
Qty: 30 TABLET | Refills: 10 | Status: SHIPPED | OUTPATIENT
Start: 2019-12-31

## 2019-12-31 NOTE — PROGRESS NOTES
Assessment/Plan:         Diagnoses and all orders for this visit:    Positive pregnancy test  -     Prenatal Vit-Fe Fumarate-FA (PRENATAL VITAMIN) 27-0 8 MG TABS; Take 1 tablet by mouth daily  Pt to RTO for viability scan  Reviewed precautions, call with vaginal bleeding, pain        Subjective:      Patient ID: Chris De La Cruz is a 34 y o  female  HPI   [id number] used, 35 yo , now a G2 with missed menses, LMP was 2019  Pt is surprised that she is pregnant  She is happy with positive UPT results  She has some intermittent nausea, she denies any vaginal bleeding, pelvic pain or leaking fluid  She does reports her breasts hurt her  She is not taking PN vitamins  patient had miscarriage on  at 2 5 months of pregnancy, dx with GDM  The following portions of the patient's history were reviewed and updated as appropriate: allergies, current medications, past family history, past medical history, past social history, past surgical history and problem list     Review of Systems   Constitutional: Negative  Respiratory: Negative  Cardiovascular: Negative  Gastrointestinal: Positive for nausea  Genitourinary: Negative  Neurological: Negative  Objective: There were no vitals taken for this visit  Physical Exam   Constitutional: She appears well-nourished  Cardiovascular: Normal rate, regular rhythm and normal heart sounds  Pulmonary/Chest: Effort normal and breath sounds normal    Abdominal: Soft  There is no tenderness  Skin: Skin is warm and dry  Psychiatric: She has a normal mood and affect   Her behavior is normal

## 2020-01-06 ENCOUNTER — HOSPITAL ENCOUNTER (EMERGENCY)
Facility: HOSPITAL | Age: 30
Discharge: HOME | End: 2020-01-06
Attending: EMERGENCY MEDICINE | Admitting: EMERGENCY MEDICINE
Payer: SUBSIDIZED

## 2020-01-06 VITALS
RESPIRATION RATE: 20 BRPM | TEMPERATURE: 99.2 F | HEART RATE: 85 BPM | BODY MASS INDEX: 36.69 KG/M2 | WEIGHT: 191 LBS | SYSTOLIC BLOOD PRESSURE: 123 MMHG | DIASTOLIC BLOOD PRESSURE: 76 MMHG | OXYGEN SATURATION: 100 %

## 2020-01-06 DIAGNOSIS — O03.9 SPONTANEOUS MISCARRIAGE: Primary | ICD-10-CM

## 2020-01-06 LAB
ALBUMIN SERPL BCP-MCNC: 3.7 G/DL (ref 3.5–5)
ALP SERPL-CCNC: 77 U/L (ref 46–116)
ALT SERPL W P-5'-P-CCNC: 52 U/L (ref 12–78)
ANION GAP SERPL CALCULATED.3IONS-SCNC: 9 MMOL/L (ref 4–13)
AST SERPL W P-5'-P-CCNC: 30 U/L (ref 5–45)
B-HCG SERPL-ACNC: 8 MIU/ML
BASOPHILS # BLD AUTO: 0.04 THOUSANDS/ΜL (ref 0–0.1)
BASOPHILS NFR BLD AUTO: 0 % (ref 0–1)
BILIRUB SERPL-MCNC: 0.2 MG/DL (ref 0.2–1)
BUN SERPL-MCNC: 13 MG/DL (ref 5–25)
CALCIUM SERPL-MCNC: 9.4 MG/DL (ref 8.3–10.1)
CHLORIDE SERPL-SCNC: 101 MMOL/L (ref 100–108)
CO2 SERPL-SCNC: 28 MMOL/L (ref 21–32)
CREAT SERPL-MCNC: 0.75 MG/DL (ref 0.6–1.3)
EOSINOPHIL # BLD AUTO: 0.14 THOUSAND/ΜL (ref 0–0.61)
EOSINOPHIL NFR BLD AUTO: 1 % (ref 0–6)
ERYTHROCYTE [DISTWIDTH] IN BLOOD BY AUTOMATED COUNT: 13.5 % (ref 11.6–15.1)
GFR SERPL CREATININE-BSD FRML MDRD: 108 ML/MIN/1.73SQ M
GLUCOSE SERPL-MCNC: 106 MG/DL (ref 65–140)
HCT VFR BLD AUTO: 42.8 % (ref 34.8–46.1)
HGB BLD-MCNC: 13.6 G/DL (ref 11.5–15.4)
IMM GRANULOCYTES # BLD AUTO: 0.06 THOUSAND/UL (ref 0–0.2)
IMM GRANULOCYTES NFR BLD AUTO: 1 % (ref 0–2)
LYMPHOCYTES # BLD AUTO: 3.83 THOUSANDS/ΜL (ref 0.6–4.47)
LYMPHOCYTES NFR BLD AUTO: 34 % (ref 14–44)
MCH RBC QN AUTO: 25.5 PG (ref 26.8–34.3)
MCHC RBC AUTO-ENTMCNC: 31.8 G/DL (ref 31.4–37.4)
MCV RBC AUTO: 80 FL (ref 82–98)
MONOCYTES # BLD AUTO: 0.81 THOUSAND/ΜL (ref 0.17–1.22)
MONOCYTES NFR BLD AUTO: 7 % (ref 4–12)
NEUTROPHILS # BLD AUTO: 6.45 THOUSANDS/ΜL (ref 1.85–7.62)
NEUTS SEG NFR BLD AUTO: 57 % (ref 43–75)
NRBC BLD AUTO-RTO: 0 /100 WBCS
PLATELET # BLD AUTO: 408 THOUSANDS/UL (ref 149–390)
PMV BLD AUTO: 9.3 FL (ref 8.9–12.7)
POTASSIUM SERPL-SCNC: 3.6 MMOL/L (ref 3.5–5.3)
PROT SERPL-MCNC: 8.2 G/DL (ref 6.4–8.2)
RBC # BLD AUTO: 5.34 MILLION/UL (ref 3.81–5.12)
SODIUM SERPL-SCNC: 138 MMOL/L (ref 136–145)
WBC # BLD AUTO: 11.33 THOUSAND/UL (ref 4.31–10.16)

## 2020-01-06 PROCEDURE — 80053 COMPREHEN METABOLIC PANEL: CPT | Performed by: EMERGENCY MEDICINE

## 2020-01-06 PROCEDURE — 84702 CHORIONIC GONADOTROPIN TEST: CPT | Performed by: EMERGENCY MEDICINE

## 2020-01-06 PROCEDURE — 99284 EMERGENCY DEPT VISIT MOD MDM: CPT | Performed by: EMERGENCY MEDICINE

## 2020-01-06 PROCEDURE — 99284 EMERGENCY DEPT VISIT MOD MDM: CPT

## 2020-01-06 PROCEDURE — 85025 COMPLETE CBC W/AUTO DIFF WBC: CPT | Performed by: EMERGENCY MEDICINE

## 2020-01-06 PROCEDURE — 36415 COLL VENOUS BLD VENIPUNCTURE: CPT | Performed by: EMERGENCY MEDICINE

## 2020-01-07 ENCOUNTER — ULTRASOUND (OUTPATIENT)
Dept: OBGYN CLINIC | Facility: CLINIC | Age: 30
End: 2020-01-07

## 2020-01-07 VITALS
SYSTOLIC BLOOD PRESSURE: 108 MMHG | HEART RATE: 85 BPM | DIASTOLIC BLOOD PRESSURE: 77 MMHG | BODY MASS INDEX: 36.88 KG/M2 | WEIGHT: 192 LBS

## 2020-01-07 DIAGNOSIS — Z31.69 INFERTILITY COUNSELING: Primary | ICD-10-CM

## 2020-01-07 DIAGNOSIS — O03.9 MISCARRIAGE: ICD-10-CM

## 2020-01-07 PROCEDURE — 99213 OFFICE O/P EST LOW 20 MIN: CPT | Performed by: OBSTETRICS & GYNECOLOGY

## 2020-01-07 NOTE — PROGRESS NOTES
Sindy EspinozaReyes 1990 female MRN: [id number]    Family Medicine Acute Visit    ASSESSMENT/PLAN  Problem List Items Addressed This Visit        Other    Miscarriage     Patient had positive pregnancy test on 12/31  LMP 11/02  -ED visit yesterday due to vaginal bleeding and cramping    -Urine pregnancy test negative today  -NSAIDs for cramping          Infertility counseling - Primary     Patient has been trying to get pregnant for more than 1 year without contraceptives  -Patient reports regular menstrual cycles  -Hx of miscarriage on June 2019  -Counseled on weight loss, exercise and balanced diet  Patient would like to try also medication to get pregnant  Options reviewed with her  Will start Clomid  -RTC in 3 months for follow up  Relevant Medications    clomiPHENE (CLOMID) 50 mg tablet            No future appointments  SUBJECTIVE  CC: Miscarriage (possible miscarriage ); Vaginal Bleeding; and Dysmenorrhea      HPI:  Michael Covarrubias is a 34 y o  female with obesity who presents for abdominal cramping and vaginal bleeding  Patient had a positive pregnancy test on 12/31/19  She presented to ED yesterday due to pelvic pain and vaginal bleeding  HCG quant 8 on ED  She has moderate VB since yesterday  Today pregnancy test in the office negative  Patient had a miscarriage on  June 2019  She states she desires pregnancy and was previously counseled on diet and exercise  She has regular menstrual cycles every 28-30 days  Review of Systems   Constitutional: Negative for appetite change, diaphoresis, fatigue and fever  HENT: Negative for congestion, rhinorrhea and sinus pain  Respiratory: Negative for cough, chest tightness and shortness of breath  Cardiovascular: Negative for chest pain, palpitations and leg swelling  Gastrointestinal: Positive for abdominal pain (cramping)  Negative for abdominal distention, constipation, diarrhea, nausea and vomiting     Genitourinary: Positive for vaginal bleeding  Negative for difficulty urinating, frequency and urgency  Musculoskeletal: Negative for back pain and neck pain  Neurological: Negative for weakness, light-headedness, numbness and headaches  Psychiatric/Behavioral: Negative for agitation and behavioral problems  The patient is not nervous/anxious  Historical Information   The patient history was reviewed as follows:  Past Medical History:   Diagnosis Date    Class 2 obesity due to excess calories without serious comorbidity with body mass index (BMI) of 35 0 to 35 9 in adult 4/16/2019         Past Surgical History:   Procedure Laterality Date    CYST REMOVAL      Vaginal area     Family History   Problem Relation Age of Onset    No Known Problems Mother     No Known Problems Father     No Known Problems Sister     No Known Problems Brother     No Known Problems Brother     No Known Problems Brother     Hyperlipidemia Maternal Grandmother     Hypertension Maternal Aunt       Social History   Social History     Substance and Sexual Activity   Alcohol Use Never    Frequency: Never     Social History     Substance and Sexual Activity   Drug Use Never     Social History     Tobacco Use   Smoking Status Never Smoker   Smokeless Tobacco Never Used       Medications:     Current Outpatient Medications:     ACCU-CHEK FASTCLIX LANCETS MISC, Test 4 times per day  (Patient not taking: Reported on 8/9/2019), Disp: 102 each, Rfl: 6    ACCU-CHEK GUIDE test strip, Test 4 times per day  (Patient not taking: Reported on 8/9/2019), Disp: 100 each, Rfl: 6    clomiPHENE (CLOMID) 50 mg tablet, Take 1 tablet (50 mg total) by mouth daily Start day 5 of menstrual period  Take 1 tablet of 50 mg daily for 5 days   Repeat for 3 cycles, Disp: 5 tablet, Rfl: 3    naproxen (NAPROSYN) 500 mg tablet, Take 1 tablet (500 mg total) by mouth 2 (two) times a day with meals (Patient not taking: Reported on 5/9/2019), Disp: 8 tablet, Rfl: 0    Prenatal Vit-Fe Fumarate-FA (PRENATAL VITAMIN) 27-0 8 MG TABS, Take 1 tablet by mouth daily, Disp: 30 tablet, Rfl: 10    traMADol (ULTRAM) 50 mg tablet, 1 q 6 hours prn pain (Patient not taking: Reported on 5/9/2019), Disp: 8 tablet, Rfl: 0    No Known Allergies    OBJECTIVE  Vitals:   Vitals:    01/07/20 0829   BP: 108/77   BP Location: Left arm   Patient Position: Sitting   Cuff Size: Adult   Pulse: 85   Weight: 87 1 kg (192 lb)         Physical Exam   Constitutional: She is oriented to person, place, and time  She appears well-developed and well-nourished  No distress  HENT:   Head: Normocephalic and atraumatic  Neck: Normal range of motion  Neck supple  Cardiovascular: Normal rate, regular rhythm, normal heart sounds and intact distal pulses  Exam reveals no gallop and no friction rub  No murmur heard  Pulmonary/Chest: Effort normal and breath sounds normal  No respiratory distress  Abdominal: Soft  Bowel sounds are normal  She exhibits no distension  There is tenderness (mild tenderness to palpation in pelvic area)  There is no guarding  Musculoskeletal: Normal range of motion  She exhibits no edema, tenderness or deformity  Neurological: She is alert and oriented to person, place, and time  Skin: Skin is warm and dry  No rash noted  She is not diaphoretic  No erythema  Psychiatric: She has a normal mood and affect  Her behavior is normal  Judgment and thought content normal    Vitals reviewed                   Crescencio Resendiz MD, PGY-2  [facility]   1/7/2020

## 2020-01-07 NOTE — ASSESSMENT & PLAN NOTE
Patient had positive pregnancy test on 12/31   LMP 11/02  -ED visit yesterday due to vaginal bleeding and cramping    -Urine pregnancy test negative today  -NSAIDs for cramping

## 2020-01-07 NOTE — ED PROVIDER NOTES
History  Chief Complaint   Patient presents with    Vaginal Bleeding - Pregnant     8 weeks pregnant, having some intermittent cramping, bleeding seems to be increasing     Pt is  at approx 8wks, in ER with c/o low abd cramping and vaginal bleeding that began early this morning  She describes the pain as her menses  Pt had a positive preg test on , [facility] -   Pt is primarily Yi speaking, language line used for translation  History provided by:  Patient   used: Yes        Prior to Admission Medications   Prescriptions Last Dose Informant Patient Reported? Taking? ACCU-CHEK FASTCLIX LANCETS MISC  Self No No   Sig: Test 4 times per day  Patient not taking: Reported on 2019   ACCU-CHEK GUIDE test strip  Self No No   Sig: Test 4 times per day     Patient not taking: Reported on 2019   Prenatal Vit-Fe Fumarate-FA (PRENATAL VITAMIN) 27-0 8 MG TABS 2020 at Unknown time  No Yes   Sig: Take 1 tablet by mouth daily   naproxen (NAPROSYN) 500 mg tablet  Self No No   Sig: Take 1 tablet (500 mg total) by mouth 2 (two) times a day with meals   Patient not taking: Reported on 2019   traMADol (ULTRAM) 50 mg tablet  Self No No   Si q 6 hours prn pain   Patient not taking: Reported on 2019      Facility-Administered Medications: None       Past Medical History:   Diagnosis Date    Class 2 obesity due to excess calories without serious comorbidity with body mass index (BMI) of 35 0 to 35 9 in adult 2019       Past Surgical History:   Procedure Laterality Date    CYST REMOVAL      Vaginal area       Family History   Problem Relation Age of Onset    No Known Problems Mother     No Known Problems Father     No Known Problems Sister     No Known Problems Brother     No Known Problems Brother     No Known Problems Brother     Hyperlipidemia Maternal Grandmother     Hypertension Maternal Aunt      I have reviewed and agree with the history as documented  Social History     Tobacco Use    Smoking status: Never Smoker    Smokeless tobacco: Never Used   Substance Use Topics    Alcohol use: Never     Frequency: Never    Drug use: Never        Review of Systems   Constitutional: Negative for chills and fever  Respiratory: Negative for cough, chest tightness and shortness of breath  Gastrointestinal: Positive for abdominal pain  Negative for diarrhea, nausea and vomiting  Genitourinary: Positive for vaginal bleeding  Negative for difficulty urinating, dysuria, frequency, hematuria, urgency and vaginal discharge  Musculoskeletal: Negative for back pain, neck pain and neck stiffness  All other systems reviewed and are negative  Physical Exam  Physical Exam   Constitutional: She is oriented to person, place, and time  She appears well-developed and well-nourished  No distress  HENT:   Head: Normocephalic and atraumatic  Eyes: Pupils are equal, round, and reactive to light  Conjunctivae are normal    Neck: Normal range of motion  Neck supple  Cardiovascular: Normal rate, regular rhythm and normal heart sounds  No murmur heard  Pulmonary/Chest: Effort normal and breath sounds normal  No respiratory distress  Abdominal: Soft  Bowel sounds are normal  She exhibits no distension  There is tenderness  Genitourinary: Pelvic exam was performed with patient supine  There is bleeding in the vagina  Genitourinary Comments: Cervix is closed, moderate vaginal bleeding  No clots noted   Musculoskeletal: Normal range of motion  She exhibits no edema or deformity  Neurological: She is alert and oriented to person, place, and time  No cranial nerve deficit  Skin: Skin is warm and dry  No rash noted  She is not diaphoretic  No pallor  Psychiatric: She has a normal mood and affect  Her behavior is normal    Nursing note and vitals reviewed        Vital Signs  ED Triage Vitals [01/06/20 2047]   Temperature Pulse Respirations Blood Pressure SpO2   99 2 °F (37 3 °C) 85 20 123/76 100 %      Temp Source Heart Rate Source Patient Position - Orthostatic VS BP Location FiO2 (%)   Tympanic Monitor Sitting Right arm --      Pain Score       6           Vitals:    01/06/20 2047   BP: 123/76   Pulse: 85   Patient Position - Orthostatic VS: Sitting         Visual Acuity      ED Medications  Medications - No data to display    Diagnostic Studies  Results Reviewed     Procedure Component Value Units Date/Time    Comprehensive metabolic panel [[id number]] Collected:  01/06/20 2137    Lab Status:  Final result Specimen:  Blood from Arm, Right Updated:  01/06/20 2203     Sodium 138 mmol/L      Potassium 3 6 mmol/L      Chloride 101 mmol/L      CO2 28 mmol/L      ANION GAP 9 mmol/L      BUN 13 mg/dL      Creatinine 0 75 mg/dL      Glucose 106 mg/dL      Calcium 9 4 mg/dL      AST 30 U/L      ALT 52 U/L      Alkaline Phosphatase 77 U/L      Total Protein 8 2 g/dL      Albumin 3 7 g/dL      Total Bilirubin 0 20 mg/dL      eGFR 108 ml/min/1 73sq m     Narrative:       [place] guidelines for Chronic Kidney Disease (CKD):     Stage 1 with normal or high GFR (GFR > 90 mL/min/1 73 square meters)    Stage 2 Mild CKD (GFR = 60-89 mL/min/1 73 square meters)    Stage 3A Moderate CKD (GFR = 45-59 mL/min/1 73 square meters)    Stage 3B Moderate CKD (GFR = 30-44 mL/min/1 73 square meters)    Stage 4 Severe CKD (GFR = 15-29 mL/min/1 73 square meters)    Stage 5 End Stage CKD (GFR <15 mL/min/1 73 square meters)  Note: GFR calculation is accurate only with a steady state creatinine    Quantitative hCG [[id number]]  (Abnormal) Collected:  01/06/20 2137    Lab Status:  Final result Specimen:  Blood from Arm, Right Updated:  01/06/20 2203     HCG, Quant 8 mIU/mL     Narrative:        Expected Ranges:     Approximate               Approximate HCG  Gestation age          Concentration ( mIU/mL)  _____________          ______________________   Bertha Donahue HCG values  0 2-1                       5-50  1-2                           2-3                         100-5000  3-4                         500-51133  4-5                         1000-98967  5-6                         [id number]  6-8                         13761-389123  8-12                        50075-047569      CBC and differential [[id number]]  (Abnormal) Collected:  01/06/20 2137    Lab Status:  Final result Specimen:  Blood from Arm, Right Updated:  01/06/20 2142     WBC 11 33 Thousand/uL      RBC 5 34 Million/uL      Hemoglobin 13 6 g/dL      Hematocrit 42 8 %      MCV 80 fL      MCH 25 5 pg      MCHC 31 8 g/dL      RDW 13 5 %      MPV 9 3 fL      Platelets 561 Thousands/uL      nRBC 0 /100 WBCs      Neutrophils Relative 57 %      Immat GRANS % 1 %      Lymphocytes Relative 34 %      Monocytes Relative 7 %      Eosinophils Relative 1 %      Basophils Relative 0 %      Neutrophils Absolute 6 45 Thousands/µL      Immature Grans Absolute 0 06 Thousand/uL      Lymphocytes Absolute 3 83 Thousands/µL      Monocytes Absolute 0 81 Thousand/µL      Eosinophils Absolute 0 14 Thousand/µL      Basophils Absolute 0 04 Thousands/µL                  No orders to display              Procedures  Procedures         ED Course                               MDM  Number of Diagnoses or Management Options  Spontaneous miscarriage:   Diagnosis management comments: Pt with positive preg test on 12/31, now with hcg of 8  Pt to f/u with PCP and OB/GYN for repeat hcg  Labs reviewed - AB positive, Ab neg from may 2019          Amount and/or Complexity of Data Reviewed  Clinical lab tests: ordered and reviewed    Risk of Complications, Morbidity, and/or Mortality  Presenting problems: moderate  Diagnostic procedures: moderate  Management options: moderate    Patient Progress  Patient progress: stable        Disposition  Final diagnoses:   Spontaneous miscarriage     Time reflects when diagnosis was documented in both MDM as applicable and the Disposition within this note     Time User Action Codes Description Comment    1/6/2020 10:44 PM Santa Greene Add [O03 9] Spontaneous miscarriage       ED Disposition     ED Disposition Condition Date/Time Comment    Discharge Stable Mon Jan 6, 2020 10:44 PM Sindy EspinozaReyes discharge to home/self care  Follow-up Information     Follow up With Specialties Details Why Contact Info    Amber Young MD Obstetrics and Gynecology, Obstetrics, Gynecology Schedule an appointment as soon as possible for a visit in 2 days for follow up and for repeat hcg [address]  [phone]            Discharge Medication List as of 1/6/2020 10:46 PM      CONTINUE these medications which have NOT CHANGED    Details   Prenatal Vit-Fe Fumarate-FA (PRENATAL VITAMIN) 27-0 8 MG TABS Take 1 tablet by mouth daily, Starting Tue 12/31/2019, Normal      ACCU-CHEK FASTCLIX LANCETS MISC Test 4 times per day , Normal      ACCU-CHEK GUIDE test strip Test 4 times per day , Normal      naproxen (NAPROSYN) 500 mg tablet Take 1 tablet (500 mg total) by mouth 2 (two) times a day with meals, Starting Mon 3/25/2019, Print      traMADol (ULTRAM) 50 mg tablet 1 q 6 hours prn pain, Print           No discharge procedures on file      ED Provider  Electronically Signed by           Omar Martinez DO  01/07/20 6107

## 2020-01-07 NOTE — DISCHARGE INSTRUCTIONS
Follow up with your primary care physician and Dr Raphael Ramirez  Return to the ER for worsening pain/cramping

## 2020-01-07 NOTE — ASSESSMENT & PLAN NOTE
Patient has been trying to get pregnant for more than 1 year without contraceptives  -Patient reports regular menstrual cycles  -Hx of miscarriage on June 2019  -Counseled on weight loss, exercise and balanced diet  Patient would like to try also medication to get pregnant  Options reviewed with her  Will start Clomid  -RTC in 3 months for follow up

## 2020-08-13 ENCOUNTER — ULTRASOUND (OUTPATIENT)
Dept: OBGYN CLINIC | Facility: CLINIC | Age: 30
End: 2020-08-13

## 2020-08-13 VITALS
HEART RATE: 90 BPM | WEIGHT: 190.8 LBS | DIASTOLIC BLOOD PRESSURE: 82 MMHG | HEIGHT: 61 IN | SYSTOLIC BLOOD PRESSURE: 138 MMHG | TEMPERATURE: 98.4 F | RESPIRATION RATE: 16 BRPM | BODY MASS INDEX: 36.02 KG/M2

## 2020-08-13 DIAGNOSIS — N91.2 AMENORRHEA: ICD-10-CM

## 2020-08-13 DIAGNOSIS — K59.00 CONSTIPATION DURING PREGNANCY IN FIRST TRIMESTER: Primary | ICD-10-CM

## 2020-08-13 DIAGNOSIS — O99.611 CONSTIPATION DURING PREGNANCY IN FIRST TRIMESTER: Primary | ICD-10-CM

## 2020-08-13 PROCEDURE — 99213 OFFICE O/P EST LOW 20 MIN: CPT | Performed by: OBSTETRICS & GYNECOLOGY

## 2020-08-13 PROCEDURE — 1036F TOBACCO NON-USER: CPT | Performed by: OBSTETRICS & GYNECOLOGY

## 2020-08-13 RX ORDER — DOCUSATE SODIUM 100 MG/1
100 CAPSULE, LIQUID FILLED ORAL 2 TIMES DAILY
Qty: 60 CAPSULE | Refills: 2 | Status: SHIPPED | OUTPATIENT
Start: 2020-08-13 | End: 2020-09-16 | Stop reason: ALTCHOICE

## 2020-08-13 NOTE — PROGRESS NOTES
Subjective:     Kai Murphy is a 27 y o   female who presents with acute onset amenorrhea with and LMP of 20 and a positive home pregnancy test     Objective:    Vitals: Blood pressure 138/82, pulse 90, temperature 98 4 °F (36 9 °C), resp  rate 16, height 5' 1" (1 549 m), weight 86 5 kg (190 lb 12 8 oz), last menstrual period 2020, unknown if currently breastfeeding  Body mass index is 36 05 kg/m²  FIRST TRIMESTER OBSTETRIC ULTRASOUND  2020  Monica Wilson MD     INDICATION: Amenorrhea, viability    COMPARISON: None  TECHNIQUE:   Transvaginal imaging was performed to assess the gestation, myometrial/endometrial architecture and ovarian parenchymal detail  The study includes volumetric sweeps and traditional still imaging technique  FINDINGS:     A single intrauterine gestation is identified  Cardiac activity is detected at 168bpm       YOLK SAC:  Present and normal in size and appearance  MEAN CROWN RUMP LENGTH:  18 7 mm = 8 weeks 3 days   AMNIOTIC FLUID/SAC SHAPE:  Within expected normal range  UTERUS/ADNEXA:   No adnexal mass or pathologic cyst   No free fluid identified  IMPRESSION:     Single intrauterine pregnancy of 8 weeks 3 days gestational age  Fetal cardiac activity detected  No adnexal masses seen         Assessment/Plan:    1) [place] IUP measuring 8 weeks 3 days gestation w/cardiac activity   -RTO for nursing intake and Prenatal H&P        Monica Wilson MD  2020  10:26 AM

## 2020-08-17 NOTE — PROGRESS NOTES
OB INTAKE INTERVIEW  Pt presents for OB intake  French   [id number] used  LMP 2020 the with NEENA of 2021  The patient had ultrasound done 2020 at 8 weeks and 3 days with NEENA of 2021  NEENA set for 2021  patient with history of 2 pregnancy losses, one   On  at 11w1d  weeks, and another on  2020 at 8 wks  Currently she denies any kind leakage of fluid, bleeding or cramping, she does report lower back pain  history of gestational diabetes      OB History    Para Term  AB Living   3 0 0 0 2 0   SAB TAB Ectopic Multiple Live Births   2 0 0   0      # Outcome Date GA Lbr Patricio/2nd Weight Sex Delivery Anes PTL Lv   3 Current            2 SAB 2020 8w0d          1 SAB 19 11w1d            Hx of  delivery prior to 36 weeks 6 days:  No   If yes, place a referral for cervical surveillance at 16 weeks  Last Menstrual Period:    Patient's last menstrual period was 2020 (exact date)  Ultrasound date:  Patient is schedule sequential screen    Estimated Date of Delivery: 3/23/21   by LMP, H&P visit scheduled  2020    Last pap smear: 2019 negative  Current Issues:  Constipation :   yes  Headaches :   Yes  Cramping:  No  Spotting :   No  PICA cravings :  No  FOB Involved:   Yes, Ant  Planned pregnancy:  No  I have these concerns about this prenatal patient:    history of 2 losses   history of gestational diabetes- will check early 1 hour GTT    Interview education   [facility] Pregnancy Essentials reviewed-in my chart    Baby and [address] Handout   [facility] for [facility]   Discussed genetic testing- sequential screen referral    Prenatal lab work: Scripts for prenatal panel, 1 hr GTT    referral- depression screen positive   WIC letter in chart   Work note in chart   Proof of pregnancy letter in chart    Influenza vaccine given today: No   Discussed Tdap vaccine 27-36 wks  Immunizations:   Immunization History   Administered Date(s) Administered    Influenza, recombinant, quadrivalent,injectable, preservative free 04/16/2019     Depression Screening Follow-up Plan: Patient's depression screening was positive with an Ramsey score of  10  Patient assessed for underlying major depression  They have no active suicidal ideations  Brief counseling provided and recommend additional follow-up/re-evaluation next office visit     Nurse/Family Partnership- referral placed:  No   If yes, place referral for nurse family partnership  BMI Counseling: There is no height or weight on file to calculate BMI  is above normal  Nutrition recommendations include reducing portion sizes, moderation in carbohydrate intake and increasing intake of lean protein  Reviewed total pregnancy weight gain of 11-20 lb  Tobacco Cessation Counseling: non-smoker  Tobacco cessation counseling was not provided  Infection Screening: Does the pt have a hx of MRSA? No  If yes- please follow MRSA protocol and obtain a nasal swab for MRSA culture  The patient was oriented to our practice and all questions were answered  Interviewed by: JR Mata 08/18/20  Virtual Regular Visit      Assessment/Plan:    Problem List Items Addressed This Visit     None      Visit Diagnoses     First trimester pregnancy    -  Primary               Reason for visit is   Chief Complaint   Patient presents with    Virtual Regular Visit        Encounter provider JR Mata    Provider located at [address] [place] [address]  [phone]      Recent Visits  No visits were found meeting these conditions     Showing recent visits within past 7 days and meeting all other requirements     Today's Visits  Date Type Provider Dept   08/18/20 Telemedicine Blake Mata 116 today's visits and meeting all other requirements Future Appointments  No visits were found meeting these conditions  Showing future appointments within next 150 days and meeting all other requirements        The patient was identified by name and date of birth  Sindy EspinozaReyes was informed that this is a telemedicine visit and that the visit is being conducted through [employer]  My office door was closed  No one else was in the room  She acknowledged consent and understanding of privacy and security of the video platform  The patient has agreed to participate and understands they can discontinue the visit at any time  Patient is aware this is a billable service  Subjective  Monalisa Gomez is a 27 y o  female    HPI  25-year-old  020 here for OB intake  Patient is [place]ian-speaking only  Patient was 2 previous pregnancy losses  Patient with history of gestational diabetes  Past Medical History:   Diagnosis Date    Class 2 obesity due to excess calories without serious comorbidity with body mass index (BMI) of 35 0 to 35 9 in adult 2019    Fibroid     Gestational diabetes     Miscarriage     Recurrent pregnancy loss, antepartum condition or complication     Varicella     disease       Past Surgical History:   Procedure Laterality Date    CYST REMOVAL      Vaginal area       Current Outpatient Medications   Medication Sig Dispense Refill    Prenatal Vit-Fe Fumarate-FA (PRENATAL VITAMIN) 27-0 8 MG TABS Take 1 tablet by mouth daily 30 tablet 10    docusate sodium (COLACE) 100 mg capsule Take 1 capsule (100 mg total) by mouth 2 (two) times a day (Patient not taking: Reported on 2020) 60 capsule 2     No current facility-administered medications for this visit  No Known Allergies    Review of Systems    Video Exam    There were no vitals filed for this visit      Physical Exam     I spent 60 minutes directly with the patient during this visit      VIRTUAL VISIT DISCLAIMER    Sindy EspinozaReyes acknowledges that she has consented to an online visit or consultation  She understands that the online visit is based solely on information provided by her, and that, in the absence of a face-to-face physical evaluation by the physician, the diagnosis she receives is both limited and provisional in terms of accuracy and completeness  This is not intended to replace a full medical face-to-face evaluation by the physician  Sindy EspinozaReyes understands and accepts these terms

## 2020-08-18 ENCOUNTER — TELEMEDICINE (OUTPATIENT)
Dept: OBGYN CLINIC | Facility: CLINIC | Age: 30
End: 2020-08-18

## 2020-08-18 DIAGNOSIS — Z34.91 FIRST TRIMESTER PREGNANCY: Primary | ICD-10-CM

## 2020-08-18 DIAGNOSIS — Z13.31 SCREENING FOR DEPRESSION: ICD-10-CM

## 2020-08-18 DIAGNOSIS — O09.291 HISTORY OF GESTATIONAL DIABETES IN PRIOR PREGNANCY, CURRENTLY PREGNANT IN FIRST TRIMESTER: ICD-10-CM

## 2020-08-18 DIAGNOSIS — Z86.32 HISTORY OF GESTATIONAL DIABETES IN PRIOR PREGNANCY, CURRENTLY PREGNANT IN FIRST TRIMESTER: ICD-10-CM

## 2020-08-18 PROBLEM — Z31.69 INFERTILITY COUNSELING: Status: RESOLVED | Noted: 2019-08-09 | Resolved: 2020-08-18

## 2020-08-18 PROBLEM — N92.6 MISSED PERIOD: Status: RESOLVED | Noted: 2019-08-09 | Resolved: 2020-08-18

## 2020-08-18 PROCEDURE — 99211 OFF/OP EST MAY X REQ PHY/QHP: CPT | Performed by: NURSE PRACTITIONER

## 2020-08-18 NOTE — PATIENT INSTRUCTIONS
Covid - 19 instructions    If you are having any of the following     Cough   Shortness of breath   Fever  If traveled internationally or to high risk [place] states  Or been in contact with someone that has     Please call:    [phone]  option 7    They will screen you and direct you to the nearest testing location     Should not come to the PCP or OB office without calling that number first        Coronavirus (COVID-19) pregnancy FAQs: Medical experts answer your questions  From [employer] com cy  [id number]  bc (courtesy of [facility])  As of 3/18/20  By Edgar Villegas 39  Medically reviewed by Society for Maternal-Fetal Medicine       We asked parents-to-be to send us their most pressing questions about coronavirus (COVID-19)  Among them: Is it still safe to deliver in a hospital? What if my ob-gyn has the virus? We sent those questions to the top docs at the Society for Maternal-Fetal Medicine  Here are their answers  The coronavirus (COVID-19) pandemic has been declared a national emergency in the [facility] by [employer]  Moms-to-be like you are concerned about everything from getting medicines to managing disruptions at work  But above and beyond any worry about lifestyle changes is a focus on your health and the impact of COVID-19 on your pregnancy  We asked obstetrics doctors who handle the most complicated pregnancy issues to answer your questions about the coronavirus  Here are their responses, provided by Dr Yany Pruitt and her colleagues at the Society for [place] 250  Am I at more risk for COVID-19 if I'm pregnant? We don't know  Pregnancy does change your immune system in ways that might make you more susceptible to viral respiratory infections like COVID-19  If you become infected, you might also be at higher risk for more severe illness compared to the general population  Although this does not appear to be the case with COVID-19, based on limited data so far, a higher risk has been true for pregnant women with other coronavirus infections (SARS-CoV and MERS-CoV) and other viral respiratory infections, such as flu  It's important to take preventive actions to avoid infection, such as washing your hands often and avoiding people who are sick  How might coronavirus affect my pregnancy? Again, we don't know  Women with other coronavirus infections (such as SARS-CoV) did not have miscarriage or stillbirth at higher rates than the general population  We know that having other respiratory viral infections during pregnancy, such as flu, has been associated with problems like low birth weight and  birth  Also, having a high fever early in pregnancy may increase the risk of certain birth defects  Could I transmit coronavirus to my baby during pregnancy or delivery? We don't know whether you could transmit COVID-19 to your baby before or during delivery  However, among the few case studies of infants born to mothers with COVID-23 published in peer-reviewed literature, none of the infants tested positive for the virus  Also, there was no virus detected in samples of amniotic fluid or breast milk  There have been a few reports of newborns as young as a few days old with infection, suggesting that a mother can transmit the infection to her infant through close contact after delivery  There have been no reports of mother-to-baby transmission for other coronaviruses (MERS-CoV and SARS-CoV), although only limited information is available  Is it safe for me to deliver at a hospital where there have been COVID-19 cases? Yes  We know that COVID-19 is a very scary virus  The good news is that hospitals are taking great precautions to keep patients and healthcare providers safe  When a patient is even suspected to have COVID-19, they are placed in a negative pressure room   (Think of these rooms as vacuums that suck and filter the air so it's safe for the other people in the hospital)  This makes it possible for you to deliver at the hospital without putting you or your baby at risk  Hospitals are also implementing stricter visiting policies to keep patients safe  It's worth calling your hospital to check if there are any new regulations to be aware of  What plans should I make now in case the hospital system is overwhelmed when it's time for me to deliver? 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 What if I work in a school, the travel [employer], or some other high-risk setting? Healthcare facilities should take care to limit the exposure of pregnant employees to patients with confirmed or suspected COVID-19, just as they would with other infectious cases  If you continue working, be sure to follow the CDC's risk assessment and infection control guidelines  If you work in a school, travel [employer], or other high-risk setting, talk with your employer about what it's doing to protect employees and minimize infection risks  Wash your hands often  What if my OB gets COVID-19? If your doctor or midwife tests positive for COVID-19, they will need to be quarantined until they recover and are no longer at risk of transmitting the virus  In this case, you'll be assigned to another OB in your doctor's practice (or you may choose another practitioner yourself)  Ask your new OB or your doctor's office if you should self-quarantine or be tested for the virus  (It will depend on when you last saw your provider and when that person tested positive )    Should we hold off on trying to conceive because of COVID-19?   At this time, there's no reason to hold off on trying to get pregnant, but the data we have is really limited  For example, we don't think the virus causes birth defects or increases your risk of miscarriage  But we don't know whether you could transmit COVID-19 to your baby before or during delivery  We also don't know if the virus lives in semen or can be sexually transmitted  We have a babymoon scheduled in the next few months - should we cancel? If you're planning to travel internationally or on a cruise, you should strongly consider canceling  At this time, the virus has reached more than 140 countries, and there are travel bans to [place], most of [place], and [place]  Places where large numbers of people gather are at highest risk, especially airports and cruise ships  If you're planning travel in the [place] , note that any travel setting increases your risk of exposure, and there may be travel bans even in [place] by the time you're scheduled to go  Even if you're state is not blocked, you'll definitely want to avoid traveling to communities where the virus is spreading  To find out where these places are, check The New York Times map based on CDC data  For the most current advice to help you avoid exposure, check the CDC's COVID-19 travel page  Will the hospital separate me from my  and keep the baby in quarantine? If you test positive for COVID-19 or have been exposed but have no symptoms, the CDC, [employer] of Obstetricians and Gynecologists, and the Society for [place] 250 all recommend that you be  from your baby to decrease the risk of transmission to the baby  This would last until you are no longer at risk of transmitting the virus  If you do not have COVID-19 and have not been exposed to the virus, the hospital will not separate you from your   My hospital is restricting visitors and only allowing one support person  If my support person leaves after the delivery, will they be allowed to come back?   Every hospital has different policies  Contact your hospital or labor and delivery unit a week or so before delivery to get the most up-to-date restrictions  In general, if your support person needs to leave, they would be allowed back unless they knew they were exposed to COVID-19 after leaving your company  Ara understands that the coronavirus (COVID-19) pandemic is an evolving story and that your questions will change over time  We'll continue asking moms and dads in our Community what they want to know, and we'll get the answers from experts to keep them - and you - informed and supported  My mom was planning to fly here to help me care for my new baby after delivery  Should I tell her not to come? Yes  If your mom is over 61 or has any serious chronic medical conditions (such as heart disease, lung disease, or diabetes), she is at higher risk of serious illness from COVID-19 and should avoid air travel  And remember that any travel setting increases a person's risk of exposure  So, it may be risky to have her around the baby after she has been traveling  For the most current advice on traveling, check the [facility]'s COVID-19 travel page  Ara understands that the coronavirus pandemic is an evolving story and that your questions will change over time  We'll continue asking moms and dads in our Community what they want to know, and we'll get the answers from experts to keep them - and you - informed and supported  El embarazo de la semana 11 a la 14   CUIDADO AMBULATORIO:   Qué cambios están ocurriendo en wheatley cuerpo: Ahora usted está al término del primer trimestre y entrando al mariam trimestre  Los [employer] matutinos por lo general desaparecen para [employer]  Es posible que usted tenga otros síntomas boni fatiga, orinar con frecuencia y madhav de [place]  Usted podría harish [employer] 2 a 4 libras hasta ahora     Busque atención médica de inmediato si:   · Usted tiene dolor o cólicos en el abdomen o la parte baja de la espalda  · Usted tiene sangrado vaginal abundante o coágulos  · Le sale un material que parece tejido o coágulos grandes  Recolecte el material y tráigalo con usted  Pregúntele a wheatley [place] vitaminas y minerales son adecuados para usted  · Usted no puede retener alimentos ni líquidos y está perdiendo [place]  · Usted tiene un sangrado leve  · Usted tiene escalofríos o fiebre  · Usted tiene comezón, ardor o dolor vaginal      · Usted tiene nadia secreción vaginal amarillenta, verdosa, tori o de [employer]  · Usted tiene dolor o ardor al Cordelia Cartwright, orina menos de lo habitual o tiene [place] rosada o sanguinolenta  · Usted tiene preguntas o inquietudes acerca de wheatley condición o cuidado  Cómo cuidarse en esta etapa de wheatley embarazo:   · Descanse lo suficiente  Es posible que usted se sienta más cansada de lo normal  Usted podría necesitar merly siestas o acostarse más temprano  · Controle la náusea y el vómito  Evite los alimentos grasosos y picantes  Coma comidas pequeñas jessica el día en vez de porciones grandes  El jengibre puede ayudar a [employer]  Consulte con wheatley médico acerca de otras formas para disminuir las náuseas y el vómito  · Consuma alimentos saludables y variados  Alimentos saludables incluyen frutas, verduras, panes de dave integral, alimentos lácteos bajos en grasa, frijoles, manju magras y pescado  [place] líquidos boni se le haya indicado  Pregunte cuánto líquido debe merly cada día y cuáles líquidos son los más adecuados para usted  Limite el consumo de cafeína a menos de [address]  Limite el consumo de pescado a 2 porciones cada semana  Escoja pescado con concentraciones bajas de candice boni atún al natural enlatado, camarón, salmón, bacalao o tilapia  No  coma pescado con concentraciones altas de candice boni pez tara, caballa gigante, pargo rayado y tiburón       · [address] indicaciones  [place] necesidad de ciertas vitaminas y [address], boni el ácido fólico, aumenta jessica el Madeleine Bones  Las vitaminas prenatales proporcionan algunas de las vitaminas y minerales adicionales que usted necesita  Las vitaminas prenatales también podrían ayudar a disminuir el riesgo de ciertos defectos de nacimiento  · No fume  Si usted fuma, nunca es demasiado tarde para dejar de hacerlo  Fumar aumenta el riesgo de aborto espontáneo y otros problemas de romel jessica wheatley Madeleine Bones  Fumar puede causar que wheatley bebé nazca antes de tiempo o que pese menos al nacer  Solicite información a wheatley médico si usted necesita ayuda para dejar de fumar  · No consuma alcohol  El alcohol pasa de wheatley cuerpo al bebé a través de la placenta  Puede afectar el desarrollo del cerebro de wheatley bebé y provocar el síndrome de alcoholismo fetal (SAF)  FAS es un sammie de condiciones que causan [address], de comportamiento y de crecimiento  · Consulte con wheatley médico antes de merly cualquier medicamento  Muchos medicamentos pueden perjudicar a wheatley bebé si usted los dora [facility] [address]  No tome ningún medicamento, vitaminas, hierbas o suplementos sin christiano consultar con wheatley Nery Churn  use drogas ilegales o de la lucio (boni marihuana o cocaína) mientras está embarazada  Consejos de seguridad jessica el embarazo:   · Evite jacuzzis y saunas  No use un jacuzzi o un sauna mientras usted está embarazada, especialmente jessica el primer trimestre  Los [facility] y los saunas aumentan la temperatura de wheatley bebé y el riesgo de defectos de nacimiento  · Evite la toxoplasmosis  [place] es nadia infección causada por comer carne cruda o estar cerca del excremento de un victoriano infectado  [place] puede causar malformaciones congénitas, aborto espontáneo y Isreal Schein  Lávese las [place] después de tocar carne cruda  Asegúrese de que la carne esté cindi cocida antes de comerla  Evite los huevos crudos y la Ezzie Dynes  Use guantes o pida que alguien la ayude a limpiar la caja de arena del victoriano mientras usted Birder Sas  Cambios que están ocurriendo con dangelo bebé: Dangelo bebé tiene completamente formadas las uñas de saba [place] y pies  Ahora dangelo latido se puede escuchar  Pregunte a dangelo médico si usted puede escuchar el latido cardíaco de dangelo bebé  Para la semana 14, dangelo bebé mide más de 4 pulgadas desde la punta de la antonio hasta la rabadilla (parte inferior del bebé)  Dangelo bebé pesa más de 3 onzas  Lo que necesita saber acerca del cuidado prenatal:  Jessica las primeras 29 semanas de dangelo embarazo, usted tendrá citas mensuales con dangelo médico  El cuidado prenatal puede ayudar a evitar problemas jessica el Radha Brunson y Jenae  Dangelo médico le revisará dangelo presión arterial y [place]  Es posible que también necesite alguno de los siguientes tratamientos:  · Un examen de orina  también podría realizarse para revisarle el azúcar y la proteína  Estas son señales de diabetes gestacional o de infección  · Se le pueden ofrecer  pruebas de detección de trastornos genéticos  Estos exámenes de detección revisan el riesgo de dangelo bebé de trastornos genéticos boni el síndrome de [place]  Los exámenes de detección incluyen un examen de nelly y un ultrasonido  · El ritmo cardíaco de dangelo bebé  será revisado  © 2017 [address] Information is for End User's use only and may not be sold, redistributed or otherwise used for commercial purposes  All illustrations and images included in CareNotes® are the copyrighted property of A D A M , Inc  or Isai Dickey  Esta información es sólo para uso en educación  Dangelo intención no es darle un consejo médico sobre enfermedades o tratamientos  Colsulte con dangelo Julio C Lauth farmacéutico antes de seguir cualquier régimen médico para saber si es seguro y efectivo para usted        Coronavirus (COVID-19) pregnancy FAQs: Medical experts answer your questions  From [employer] com cy  [id number]  bc (courtesy of [facility])  As of 3/18/20  By Edgar Beaver 39  Medically reviewed by Society for Maternal-Fetal Medicine       We asked parents-to-be to send us their most pressing questions about coronavirus (COVID-19)  Among them: Is it still safe to deliver in a hospital? What if my ob-gyn has the virus? We sent those questions to the top docs at the Society for Maternal-Fetal Medicine  Here are their answers  The coronavirus (COVID-19) pandemic has been declared a national emergency in the [facility] by [employer]  Moms-to-be like you are concerned about everything from getting medicines to managing disruptions at work  But above and beyond any worry about lifestyle changes is a focus on your health and the impact of COVID-19 on your pregnancy  We asked obstetrics doctors who handle the most complicated pregnancy issues to answer your questions about the coronavirus  Here are their responses, provided by Dr Lluvia Gastelum and her colleagues at the Society for [place] 250  Am I at more risk for COVID-19 if I'm pregnant? We don't know  Pregnancy does change your immune system in ways that might make you more susceptible to viral respiratory infections like COVID-19  If you become infected, you might also be at higher risk for more severe illness compared to the general population  Although this does not appear to be the case with COVID-19, based on limited data so far, a higher risk has been true for pregnant women with other coronavirus infections (SARS-CoV and MERS-CoV) and other viral respiratory infections, such as flu  It's important to take preventive actions to avoid infection, such as washing your hands often and avoiding people who are sick  How might coronavirus affect my pregnancy? Again, we don't know   Women with other coronavirus infections (such as SARS-CoV) did not have miscarriage or stillbirth at higher rates than the general population  We know that having other respiratory viral infections during pregnancy, such as flu, has been associated with problems like low birth weight and  birth  Also, having a high fever early in pregnancy may increase the risk of certain birth defects  Could I transmit coronavirus to my baby during pregnancy or delivery? We don't know whether you could transmit COVID-19 to your baby before or during delivery  However, among the few case studies of infants born to mothers with COVID-23 published in peer-reviewed literature, none of the infants tested positive for the virus  Also, there was no virus detected in samples of amniotic fluid or breast milk  There have been a few reports of newborns as young as a few days old with infection, suggesting that a mother can transmit the infection to her infant through close contact after delivery  There have been no reports of mother-to-baby transmission for other coronaviruses (MERS-CoV and SARS-CoV), although only limited information is available  Is it safe for me to deliver at a hospital where there have been COVID-19 cases? Yes  We know that COVID-19 is a very scary virus  The good news is that hospitals are taking great precautions to keep patients and healthcare providers safe  When a patient is even suspected to have COVID-19, they are placed in a negative pressure room  (Think of these rooms as vacuums that suck and filter the air so it's safe for the other people in the hospital)  This makes it possible for you to deliver at the hospital without putting you or your baby at risk  Hospitals are also implementing stricter visiting policies to keep patients safe  It's worth calling your hospital to check if there are any new regulations to be aware of      What plans should I make now in case the hospital system is overwhelmed when it's time for me to deliver? 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 What if I work in a school, the travel [employer], or some other high-risk setting? Healthcare facilities should take care to limit the exposure of pregnant employees to patients with confirmed or suspected COVID-19, just as they would with other infectious cases  If you continue working, be sure to follow the CDC's risk assessment and infection control guidelines  If you work in a school, travel [employer], or other high-risk setting, talk with your employer about what it's doing to protect employees and minimize infection risks  Wash your hands often  What if my OB gets COVID-19? If your doctor or midwife tests positive for COVID-19, they will need to be quarantined until they recover and are no longer at risk of transmitting the virus  In this case, you'll be assigned to another OB in your doctor's practice (or you may choose another practitioner yourself)  Ask your new OB or your doctor's office if you should self-quarantine or be tested for the virus  (It will depend on when you last saw your provider and when that person tested positive )    Should we hold off on trying to conceive because of COVID-19? At this time, there's no reason to hold off on trying to get pregnant, but the data we have is really limited  For example, we don't think the virus causes birth defects or increases your risk of miscarriage  But we don't know whether you could transmit COVID-19 to your baby before or during delivery  We also don't know if the virus lives in semen or can be sexually transmitted  We have a babymoon scheduled in the next few months - should we cancel?   If you're planning to travel internationally or on a cruise, you should strongly consider canceling  At this time, the virus has reached more than 140 countries, and there are travel bans to [place], most of [place], and [place]  Places where large numbers of people gather are at highest risk, especially airports and cruise ships  If you're planning travel in the [place] , note that any travel setting increases your risk of exposure, and there may be travel bans even in [place] by the time you're scheduled to go  Even if you're state is not blocked, you'll definitely want to avoid traveling to communities where the virus is spreading  To find out where these places are, check The New York Times map based on CDC data  For the most current advice to help you avoid exposure, check the CDC's COVID-19 travel page  Will the hospital separate me from my  and keep the baby in quarantine? If you test positive for COVID-19 or have been exposed but have no symptoms, the CDC, [address] of Obstetricians and Gynecologists, and the Society for [place] 250 all recommend that you be  from your baby to decrease the risk of transmission to the baby  This would last until you are no longer at risk of transmitting the virus  If you do not have COVID-19 and have not been exposed to the virus, the hospital will not separate you from your   My hospital is restricting visitors and only allowing one support person  If my support person leaves after the delivery, will they be allowed to come back? Every hospital has different policies  Contact your hospital or labor and delivery unit a week or so before delivery to get the most up-to-date restrictions  In general, if your support person needs to leave, they would be allowed back unless they knew they were exposed to COVID-19 after leaving your company  [facility] understands that the coronavirus (COVID-19) pandemic is an evolving story and that your questions will change over time   We'll continue asking moms and dads in our Community what they want to know, and we'll get the answers from experts to keep them - and you - informed and supported  My mom was planning to fly here to help me care for my new baby after delivery  Should I tell her not to come? Yes  If your mom is over 61 or has any serious chronic medical conditions (such as heart disease, lung disease, or diabetes), she is at higher risk of serious illness from COVID-19 and should avoid air travel  And remember that any travel setting increases a person's risk of exposure  So, it may be risky to have her around the baby after she has been traveling  For the most current advice on traveling, check the [facility]'s COVID-19 travel page  [facility] understands that the coronavirus pandemic is an evolving story and that your questions will change over time  We'll continue asking moms and dads in our Community what they want to know, and we'll get the answers from experts to keep them - and you - informed and supported  [facility] desea decirle ¡Felicitaciones por Rylie Sears! Nos complace que nos haya elegido para ser wheatley proveedor de saba servicios de romel médica jessica wheatley Jodi Kobus  Wheatley romel, la romel de wheatley hijo por nacer (niños) y wheatley cami son importante para nosotros  Ahora, más que [place], wheatley romel será lo más importante para usted  El crecimiento y el progreso de wheatley bebé pueden verse afectados por la forma en que usted se cuide  Es [place] Alesha buena idea planificar con anticipación  Es un hecho conocido que las mujeres que reciben atención mèdica desde temprano en  Jalyn Doris y jessica todo el embarazo tienen bebés más saludables  Se programarán visitas para usted jessica todo Jalny Doris  Es wheatley deber cumplir con saba citas y seguir las instrucciones para wheatley cuidado  El [place] de visitas será decidido por wheatley médico  No tengas miedo de hacer preguntas   Hable con saba enfermeras y proveedores sobre saba planes de parto y cualquier inquietud que pueda Evita Norton de Verificación  ; Llame a Medicina Materno Fetal (MFM) al [phone] para programar wheatley jacki   Cribado secuencial (opcional)   Hecho entre 12 y 15 semanas de gestación  - ultrasonido  - Riesgos para la trisomía 25 y 24   - La uriel bífida (segunda parte), después de 16 semanas, será explicada por la oficina de [facility]   [place] QUAD, si corresponde    ; Exploración de anatomía  o ultrasonido de 20 semanas  o El género sexo, puede ser revelado, si lo desea  o Jacki de 1 hora, solo se permite 1 persona de apoyo, NO niños    ; Análisis de nelly: complete antes de wheatley jacki de H&P   NO tiene que ayunar para ningún análisis de nelly a menos que se lo indiquen  - CBC, Tipo de [place] y 200 Exempla [place] de anticuerpos, Análisis de Marcelino mclaughlin, Alyssa de glucosa al susan, VIH, sífilis, hepatitis B   SI corresponde: 1 hora de Glucola o Hemoglobina A1C   orina de 24 horas, CMP, proporción de creatinina proteica    ; Ariella Mica y física: jacki de H&P   examen físico   Examen pélvico: Ronelle Oni y Clamidia   Si es necesario: Papanicolaou    Plan:  ; Visitas prenatales de rutina cada 4 semanas hasta las 28 semanas   En saba visitas prenatales:  - Monitoreo de los el latidos del corazón del bebé y medir wheatley delaney en crecimiento, Recolecte nadia [place] de Marcelino mclaughlin, y se tomara wheatley peso y presión arterial  ; 28 semanas: las visitas prenatales serán cada 2 semanas   vacuna TDaP   Conteo sanguíneo completo   RPR   tipo de [place] y anticuerpos   Se puede administrar [place] en bárbara momento, si es necesario   Diabetes gestacional, prueba de Glucola de 1 hora (sin ayuno)   Si anat la Glucola de 1 hora, wheatley proveedor solicitará nadia Glucola de 3 horas  La prueba de Glucola de 3 horas es en ayunas     Si no pasa la prueba de Glucola de 3 horas, será derivada al equipo de educación para diabéticos de Medicina Materno Fetal  Nonda Radhika osorio al [phone]   ; Comience a realizar conteos diarios de las patadas fetales  ; Clases prenatales   Clases preparadas de [employer], [address], cuidado del recién BEBA [place] para bebés / niños, clínica de asientos para automóviles y otros   Llame a [place] CLASSES [phone] para registrarse   Si el formulario de registro no [place], gillian clase, muy probablemente, está llena y deberá elegir nadia Jazmín [place] / hora diferente   Hemos tenido [employer] sorprendente a nuestras clases, así que regístrese temprano para garantizar wheatley clase deseada   Asegúrese de consultar con wheatley compañía de seguros, ya que algunas compañías de seguros reembolsarán parte o la totalidad de las tarifas asociadas a la clase  ; 36 semanas: las visitas prenatales comienzan a ser semanales   Se recolectará el sammie Beta Strep (GBS)   [place] se hace con un hisopo vaginal en la oficina   También se obtendrán pruebas de tacos / Marylu Vázquez, si corresponde   ; Prepárese para el parto   Prepare wheatley bolso y pertenencias para el hospital   Familiarícese con las pautas de visita   ; RELAJECE   Disfrute las últimas semanas de wheatley Elisabet Reeve  Señales de [place] en el embarazo: Amina Medicus  [phone]    1  Sangrado vaginal  2  Dolor abdominal brent que no desaparece  3  Fiebre (más de 100 4 y no se david con Tylenol)  4  Vómitos persistentes que monroy más de 24 horas  5  dolor de pecho  6  Dolor o ardor al orinar  7  Dolor de antonio severo que no se resuelve con Tylenol  8  Visión borrosa o justice puntos en wheatley visión  9  Hinchazón repentina de wheatley mushtaq o [place]  10  Enrojecimiento, hinchazón o dolor en nadia pierna  11  Un aumento de peso repentino en pocos días  12  Contar los movimientos fetales del bebé  (después de 28 semanas o el sexto mes de embarazo)  15  Nadia pérdida de líquido acuoso de la vagina: puede ser un chorro, un goteo o nadia humedad continua  14   Después de 20 semanas de embarazo, calambres rítmicos (más de 4 por hora) o menstruales boni dolor bajo / Swathi Rush y [place]:  La siguiente lista de medicamentos de Ariane Zhou generalmente se considera mendez de merly jessica el Deon Jollyga cuidado de no duplicar los productos que contienen acetaminofén (Tylenol)  Resfriados / dolor de garganta   Robitussin DM - Simple (guaifenesina)   Aerosol nasal salino   Gárgaras de agua salada caliente  Cepacol pastillas para la garganta o enjuague bucal (cetilpiridinio)   Sucrets (hexylresoricinol)    Abraham Belen LA D - o DESCONGESANTE   Claritin (loratadine)   Zrytec (cetirizina)   Allerga (fexofenadina)    Sarina de Nicolasa  / Aletta Calender y molestias:   Tylenol (paracetamol) (acetaminophen)    NO exceda más de 3000 mg de Tylenol en un período de 24 horas  Acidez   Mylanta (hidróxido de aluminio / simeticona, hidróxido de magnesio)   Maalaox (hidróxido de aluminio y [place], hidróxido de magnesio)   Tums (carbonato de calcio)   Riopan (magaldrate)    Estreñimiento   Colace (docusato de sodio)   Surfak (docusato de sodio)   MiraLAX   Supositorios de glicerina   Flota enema (fosfato de sodio y bifosfato de sodio)      Náuseas vómitos   Vitamina B6 (piridoxina): puede merly 50 mg a la hora de WEDGECARRUP, 25 mg por la mañana, 25 mg por la tarde   Unisom (doxilamina): se puede usar para las náuseas / vómitos (magy nadia tableta de 25 mg por la mitad)  Puede causar somnolencia  Dormir   Benadryl (difenhidramina): tome 1-2 tabletas según sea necesario antes de acostarse   Tableta Unisom (doxilamina) de 25 mg    Tableta de melatonina de 5 mg: según sea necesario antes de acostarse      En general, la forma genérica de la medicina suele ser más económica que la forma de palma del [facility]  CUIDADO AMBULATORIO:   Lo qué necesita saber sobre el Deon Thomson: Un embarazo normal dura alrededor de 40 semanas  El primer trimestre dura desde wheatley último periodo hasta la semana 12 de Deon Orovada   El mariam trimestre se extiende desde la semana 13 de wheatley embarazo hasta la semana 23  El tercer trimestre se extiende desde la semana 24 de embarazo hasta que nazca wheatley bebé  Si usted conoce la fecha de wheatley último periodo, wheatley médico puede calcular la fecha de nacimiento de wheatley bebé  Es posible que usted de a radha a wheatley bebé en cualquier momento desde la semana 37 hasta 2 semanas después de la fecha calculada de [place]  Comuníquese con wheatley médico si:   · Usted tiene calambres, presión o tensión abdominal   · Usted tiene un cambio en la secreción vaginal   · Usted no puede retener alimentos ni líquidos y está perdiendo [place]  · Usted tiene escalofríos o fiebre  · Usted tiene comezón, ardor o dolor vaginal    · Usted tiene nadia secreción vaginal amarillenta, verdosa, tori o de [employer]  · Usted tiene dolor o ardor al Ángel Modest, orina menos de lo habitual o tiene [place] rosada o sanguinolenta  · Usted tiene preguntas o inquietudes acerca de wheatley condición o cuidado  Cambios corporales que pueden ocurrir jessica wheatley embarazo:   · Los cambios en los senos  que usted [place] sensibilidad y cosquilleo jessica la primera parte de wheatley [place]  Los senos se volverán más grandes  Es posible que necesite un sostén con soporte  Es posible que usted antonia [facility] secreción delgada y LEVI, conocida boni calostro, que sale de saba pezones jessica el mariam trimestre  El calostro es un líquido que se convertirá en [place] alrededor de 3 días después de usted harish dado a radha  · Cambios en la piel y estrías  podrían ocurrir jessica wheatley embarazo  Es posible que usted tenga marcas pal, conocidas boni estrías, en wheatley piel  Las [employer] se desvanecen después del [facility]  Utilice crema si wheatley piel está seca y con comezón  La piel de wheatley mushtaq, alrededor de los pezones y debajo de wheatley ombligo podría oscurecerse  La mayoría del [place], wheatley piel Alvie Masoud a wheatley color normal después del nacimiento de wheatley bebé     · El malestar matutino  consiste en náuseas y vómitos que pueden ocurrir en cualquier momento del día  Evite los alimentos grasosos y picantes  Coma comidas pequeñas jessica el día en vez de porciones grandes  El jengibre puede ayudar a [employer]  Consulte con wheatley médico acerca de otras formas para disminuir las náuseas y el vómito  · Acidez estomacal  puede ser causada por los cambios hormonales jessica wheatley [place]  El [place] en crecimiento puede empujar wheatley estómago hacia arriba y forzar ácido estomacal a acumularse dentro de wheatley esófago  [place] 4 o 5 comidas pequeñas cada día en vez de comidas grandes  Evite los alimentos picantes  Evite comer aguila antes de irse a la cama  · Estreñimiento  puede desarrollarse jessica wheatley embarazo  Para tratar el estreñimiento, coma alimentos altos en fibra boni cereales con fibra, frijoles, frutas, verduras, panes integrales y [place]  Bernestine Balzarine de [place] regular y tome suficiente agua  Es posible que wheatley médico sugiera un suplemento con fibra para ablandar saba evacuaciones intestinales  Consulte con wheatley médico antes de usar cualquier medicamento para disminuir el estreñimiento  · Las hemorroides  son Kendall Hock grandes en el área rectal  Pueden causar dolor, comezón y sangrado de color weber vivo en wheatley recto  Para disminuir el riesgo de hemorroides, prevenga el estreñimiento y no se esfuerce cuando tenga nadia evacuación intestinal  Si usted tiene hemorroides, sumérjase en nadia bañera con agua tibia para aliviar la incomodidad  Consulte con wheatley médico cómo puede tratar las hemorroides  · Los calambres y la hinchazón en las piernas  pueden ser causados por niveles bajos de calcio o por el peso adicional del Sonya  Eleve saba piernas por encima del nivel de wheatley corazón para disminuir la hinchazón  Jessica un calambre en la pierna, estire o de un masaje al [employer] que tiene el calambre  El calor puede ayudar a disminuir el dolor y los espasmos musculares   Aplique calor sobre el músculo por 20 a 30 minutos cada 2 horas por la cantidad de días que se le indique  · Dolor en la espalda  puede ocurrir a medida que wheatley bebé crece  No esté de pie por largos periodos de tiempo ni levante objetos pesados  Use nadia buena postura mientras esté de pie, se agache o se doble  Use zapatos de tacón bajo con un buen soporte  Descansar puede también ayudarla a aliviar el dolor de espalda  Pregunte a wheatley médico acerca de ejercicios que usted pueda hacer para fortalecer los músculos de wheatley espalda  Manténgase saludable jessica wheatley embarazo:   · Consuma alimentos saludables y variados  Alimentos saludables incluyen frutas, verduras, panes de dave integral, alimentos lácteos bajos en grasa, frijoles, manju magras y pescado  [place] líquidos boni se le haya indicado  Pregunte cuánto líquido debe merly cada día y cuáles líquidos son los más adecuados para usted  o Cafeína: no está heather cómo la cafeína afecta el embarazo  Limite wheatley consumo de cafeína para evitar posibles problemas de romel   - Limite la cafeína a menos de 200 miligramos por día  - La cafeína se puede encontrar en el café, el té, la cola, las bebidas deportivas y el chocolate  o  Alimentos que contienen candice: el candice se encuentra naturalmente en [place] todos los tipos de pescados y mariscos  Algunos tipos de peces absorben niveles más altos de candice que pueden ser dañinos para un bebé ángel  Coma solo pescado y mariscos bajos en candice  - Limite wheatley consumo de pescado a 2 porciones por semana  - Elija pescado con bajo contenido de candice, boni atún heather enlatado, camarones, cangrejo, salmón, bacalao o tilapia   o No coma pescado con alto contenido de [employer] pez tara, el pez azulejo, la caballa real y el tiburón  - Cada semana, puede comer hasta 12 onzas de pescado o mariscos que tienen bajos niveles de [employer]  Estos incluyen camarones, atún heather enlatado, salmón, abadejo y [place]  o Coma solo 6 onzas de atún scott (scott) por semana   El atún scott tiene Sean el atún [place]  · [id number] [place]  Wheatley necesidad de ciertas vitaminas y [facility], boni el ácido fólico, aumenta jessica el [facility]  Las vitaminas prenatales proporcionan algunas de las vitaminas y minerales adicionales que usted necesita  Las vitaminas prenatales también podrían ayudar a disminuir el riesgo de ciertos defectos de nacimiento  · Pregunte cuánto peso usted debe aumentar jessica wheatley embarazo  Demasiado aumento de peso o muy poco puede ser poco saludable para usted y wheatley bebé  · Ejercicio: hable con wheatley proveedor de [employer] ejercicio  El ejercicio moderado puede ayudarlo a mantenerse en forma  Wheatley proveedor de [employer] ayudará a planificar un programa de ejercicios que sea seguro para usted jessica el [facility]  · Elsi muchos líquidos mientras hace ejercicio para mantenerse hidratado  Tenga cuidado para evitar el sobrecalentamiento  o EVITE los ejercicios que pueden hacer que pierda el equilibrio   o Actividades que pueden poner a wheatley bebé en riesgo, es decir, montar a stacy, bucear, esquiar o hacer snowboard  o Después de wheatley primer trimestre, evite los ejercicios que requieren que se acueste boca arriba   o EVITE exceder nadia frecuencia cardíaca mayor de 140 latidos por minuto  Siempre que pueda mantener nadia conversación mientras hace ejercicio, es probable que wheatley ritmo cardíaco sea aceptable   ; Siempre use el cinturón de seguridad   El cinturón de hombro debe estar sobre el pecho (entre los senos) y lejos del gissel   Asegure el cinturón de regazo debajo de wheatley vientre para que se ajuste cómodamente en saba caderas y hueso pélvico   ; Realizar el ejercicio de [place]   Imagínese deteniendo el flujo de orina, contrayendo los músculos de wheatley piso pélvico  Mantenga gillian contracción jessica 10 segundos y suelte   Se pueden repetir 10-20 veces por día  · No fume    Si usted fuma, nunca es demasiado tarde para dejar de hacerlo  Fumar aumenta el riesgo de aborto espontáneo y otros problemas de romel jessica wheatley [place]  Fumar puede causar que wheatley bebé nazca antes de tiempo o que pese menos al nacer  Solicite información a wheatley médico si usted necesita ayuda para dejar de fumar  · No consuma alcohol  El alcohol pasa de wheatley cuerpo al bebé a través de la placenta  Puede afectar el desarrollo del cerebro de wheatley bebé y provocar el síndrome de alcoholismo fetal (SAF)  SAF es un sammie de condiciones que causan [address], de comportamiento y de crecimiento  · Consulte con wheatley médico antes de merly cualquier medicamento  Muchos medicamentos pueden perjudicar a wheatley bebé si usted los dora [facility]  No tome ningún medicamento, vitaminas, hierbas o suplementos sin christiano consultar con wheatley médico    · Nunca  use drogas ilegales o de la lucio (boni marihuana o cocaína) mientras está embarazada  Consejos de seguridad:   · Evite jacuzzis y saunas  No use un jacuzzi o un sauna mientras usted está embarazada, especialmente jessica el primer trimestre  Los [facility] y los saunas aumentan la temperatura de wheatley bebé y el riesgo de defectos de nacimiento  · Evite la toxoplasmosis  [place] es nadia infección causada por comer carne cruda o estar cerca del excremento de un victoriano infectado  [place] puede causar malformaciones congénitas, aborto espontáneo y Isreal Cisnerosese las [place] después de tocar carne cruda  Asegúrese de que la carne esté cindi cocida antes de comerla  Evite los huevos crudos y la Lexine Durbin  Use guantes o pida que alguien la ayude a limpiar la caja de arena del victoriano mientras usted Karole Laundry  · Consulte con wheatley médico acerca de viajar  El [address] cómodo para viajar es jessica el mariam trimestre  Pregunte a wheatley médico si usted puede viajar después de las 36 semanas  Es posible que no pueda viajar en avión después de las 36 [facility]   También le puede recomendar que evite largos viajes por carretera  Programe un control con wheatley médico u obstetra según lo indicado:  Vaya a todas tea citas prenatales jessica wheatley embarazo  Anote tea preguntas para que se acuerde de hacerlas jessica tea visitas  [place] y vómito en el embarazo       CUIDADO AMBULATORIO:   La náusea y el vómito en el embarazo  pueden suceder a cualquier hora del día  Estos síntomas usualmente comienzan antes de la semana 9 del embarazo y terminan para la semana 14 (mariam trimestre)  Algunas mujeres pueden tener náusea o vómito por un tiempo prolongado  Estos síntomas pueden afectar a algunas mujeres jessica el embarazo  La náusea y el vómito no dañan a wheatley bebé  Estos síntomas pueden dificultarle tea actividades diarias  Pregúntele a wheatley Brinda Broom vitaminas y minerales son adecuados para usted  · Usted vomita más de 4 veces en 1 día  · Usted no ha podido retener líquidos en el estómago por más de 1 día  · Usted pierde más de 2 libras  · Usted tiene fiebre  · Tea náuseas y vómito continúan por más de 14 semanas  · Usted tiene preguntas o inquietudes acerca de wheatley condición o cuidado  El tratamiento  para la náusea y el vómito en el embarazo generalmente no es necesario  Usted puede [employer] alimentos que come y en tea actividades para ayudar a controlar tea síntomas  Es posible que usted necesite probar varias cosas para determinar qué funciona mejor para usted  Hable con wheatley médico si tea síntomas no mejoran con los cambios que se recomiendan a continuación  Es posible que usted necesite vitamina B6 y medicamento si estos cambios no ayudan o si tea síntomas se vuelven graves  Cambios de nutrición que usted puede realizar para controlar la náusea y el vómito:   · Coma porciones pequeñas jessica el día en vez de 3 comidas con porciones grandes  Es más probable que usted tenga náusea y vómito cuando wheatley estómago está vacío  Consuma alimentos bajos en grasa y ricos en proteínas   Ejemplos son tonya Mares, pavo y asher sin benjamin Vargas, boni galletas saladas, cereal seco o un sandwich chico antes de WEDGECARRUP  · Coma galletas saladas o pan herbert antes de levantarse de wheatley cama por la mañana  Levántese de la cama lentamente  Los movimientos repentinos podrían provocarle mareos y [place]  · Consuma alimentos blandos cuando se sienta con náuseas  Ejemplos de alimentos blandos son el pan herbert, cereal seco, pasta sin Kristen Mckeon y mathews  Otros alimentos blandos incluyen a las [employer], plátanos, gelatina y pretzels  Evite los alimentos condimentados, grasosos y fritos  Evite otros alimentos que le provoquen náuseas  · Manuel Garcia II líquidos que contengan gengibre  Manuel Garcia II refresco de gengibre hecho con gengibre real o té de gengibre hecho con gengibre fresco rallado  Las [employer] o dulces de gengibre también podrían ayudar a aliviar la náusea y el vómito  · Manuel Garcia II líquidos entre alimentos en vez de tomarlos con los alimentos  Espere al menos 30 minutos después de comer para merly líquidos  Manuel Garcia II cantidades pequeñas de líquidos con frecuencia jessica el día para evitar la deshidratación  Consulte cuál es la cantidad de líquido que usted debería consumir al día  Otros cambios que usted puede realizar para controlar la náusea y el vómito:   · Evite los olores que la [place]  Los olores nicolas podrían provocar que [employer] náuseas y el vómito, o podrían empeorarlo  Camine un poco, prenda un ventilador o trate de dormir con la ventana abierta para respirar aire fresco  Cuando esté cocinando, zuleyka las ventanas para eliminar el olor que podría provocarle náuseas  · No se cepille saba dientes inmediatamente después de comer  si eso le provoca náuseas  · Descanse cuando lo necesite  Comience nadia actividad lentamente y vuelva a wheatlye rutina normal conforme se empiece a sentir mejor  · Hable con wheatley médico acerca de las vitaminas prenatales    Las vitaminas prenatales pueden provocar náuseas a algunas mujeres  Trate de tomárselas por la noche o con un bocadillo  Si bárbara cambio no le [facility], wheatley médico podría recomendarle un tipo de vitamina diferente  · No use ningún medicamento, vitamina o suplemento para controlar saba síntomas sin antes consultarlo con wheatley médico   Varios medicamentos pueden dañar a wheatley bebé que no ha nacido  · El ejercicio de ligero a moderado  podría ayudar a aliviar saba síntomas  También podría ayudarla a dormir mejor por la noche  Pregunte a wheatley médico acerca del mejor plan de ejercicio para usted  Beneficios de la lactancia materna   son menos propensos a desarrollar alergias   Tienen nadia mayor protección contra la meningitis bacteriana   Tienen menos infecciones del oído medio   Tienen menos dificultades de aprendizaje    Tienen menos dificultades de comportamiento   Tienen un coeficiente intelectual más alto   Tienen tasas más bajas de enfermedades respiratorias   Tienen holli obesidad    Son menos propensos a desarrollar diabetes juvenil y algunos cánceres infantiles   Tienen menos probabilidades de morir por Síndrome de [facility]   Un bebé más saludable significa menos visitas al médico y a la farmacia   La lactancia materna es ecológica  No hay nada que tirar   La lactancia materna es gratis; La fórmula puede costar más de $ 1000 jessica el primer año de david   Hay menos sangrado vaginal inmediatamente después del parto y un retorno más rápido a wheatley tamaño anterior al [facility]  Las [place] que amamantan jessica un total de 2 años tienen  ; Nadia disminución del 40% en el riesgo de cáncer de seno  ; Disminución del riesgo de cáncer de ovario   ; Tasas más bajas de osteoporosis, diabetes y enfermedades del corazón  Importancia de la lactancia materna exclusiva   Al proporcionar el alimento ideal para el crecimiento y desarrollo saludable de los bebés, solo se les alimenta con [place], esto es amamantamiento exclusivo     Jaspreet Fiddler materna [facility] primeros 6 meses es muy importante para mejorar la romel de un bebé y reducir las enfermedades infantiles  Lactancia materna exclusiva jessica los primeros 6 meses  [facility] de Pediatría recomienda la lactancia materna exclusiva jessica los primeros seis meses y la lactancia materna continua con suplementos de sólidos jessica los próximos 6 meses  Inicio temprano de la lactancia materna   Las mujeres que alimentan a saba bebés dentro de la primera hora de nacimiento se conocen boni inicio temprano de la lactancia materna y aseguran que el recién nacido reciba calostro   El calostro es rico en anticuerpos y nutrientes esenciales  Compartiendo la habitación   Puede estabilizar la respiración y la frecuencia cardíaca del recién nacido   Reduce el llanto   Mejorar la capacidad del recién nacido para mantener la temperatura y los niveles de azúcar en la nelly   Estimulación temprana del sistema inmunitario del bebé   efectos calmantes   Enlace más fácil y rápido   El compartir la habitación promueve conocer a wheatley recién nacido   El alojamiento conjunto facilita la lactancia materna   Las mujeres que se alojan con saba recién nacidos producen [place] y producen un buen suministro de [place]   Se hace más fácil reconocer las señales de alimentación del bebé   Los bebés tienen sesiones de lactancia más largas   Las mujeres que comparten habitación con saba recién nacidos tienen más probabilidades de amamantar exclusivamente en comparación con las mujeres que están separadas de saba recién nacidos  Piel con piel   El contacto piel con piel debe ocurrir independientemente de la preferencia de alimentación de Chapis yanes o el modo de [place]   Después del parto de wheatley bebé, es importante que nadia madre nandini y wheatley bebé tengan un contacto ininterrumpido de piel a piel     El contacto piel con piel debe ocurrir inmediatamente después del nacimiento jessica al menos nadia o dos horas y hasta después de la primera alimentación para las madres que [place]   El contacto piel con piel significa colocar recién nacidos secos y sin ropa sobre el pecho desnudo de wheatley [place], con mantas calientes cubriendo la espalda del bebé   Todos los procedimientos de rutina, boni las evaluaciones de [place] y recién nacidos, pueden realizarse jessica el contacto piel con piel o pueden retrasarse hasta después del período sensible inmediatamente después del nacimiento   Estamos orgullosos de ofrecer amplios servicios educativos y de apoyo para alentar a las madres a amamantar   Ellen servicio ofrece información, educación y [de-identified] para mujeres que erasmo amamantar  Las [place] pueden dejar un mensaje o nadia pregunta sobre la lactancia en línea en cualquier momento del día  Las enfermeras responderán dentro de las 72 horas   La línea de respuesta de lactancia materna es [phone] ([phone])  NO deje mensajes urgentes o emergentes en esta línea de mensaje  Comuníquese con wheatley médico Gevena Lips si tiene alguna pregunta o inquietud urgente   En sejal de sospecha de nadia afección médica de emergencia, LLAME AL [address] [facility]

## 2020-08-18 NOTE — LETTER
Work Letter    Sindy EspinozaReyes  1990  [address] [address]    Dear Vicky Reeves,      08/18/20        Your employee is a patient at [facility]  We recommend that all pregnant women:    1  Have a well-ventilated workspace  2  Wear low-heeled shoes  3  Work no more than 40 hours per week  4  Have a 15 minute break every 2 hours and at least 30 minutes for a meal break  5  Use good body mechanics by bending at your knees to avoid back strain and lift no more than 20 pounds without assistance  Will need assistance with lifting over 20 lbs  6  Have ready access to bathrooms and water  She may continue to work until her due date unless medical complications arise  We anticipate she may return to work in 6-8 weeks after delivery       Sincerely,    [facility]

## 2020-08-18 NOTE — LETTER
Proof of Pregnancy Letter    Sindy EspinozaReyes  1990  [address] [address]        08/18/20      Sindy EspinozaReyes is a patient at our facility  Sindy EspinozaReyes Estimated Date of Delivery: 3/23/21       Any questions or concerns, please feel free to contact our office      Sincerely,     [facility]   [address]   [facility], [address] [place] [address]   [phone]

## 2020-08-18 NOTE — LETTER
[address] Letter    Sindy EspinozaReyes  1990  [address] [address]       08/18/20          Lynsey SánchezgeraldineRovertosanchez is a patient and under our care in our office  Lynsey Abesanchez's Estimated Date of Delivery: 3/23/21  Any questions or concerns feel free to contact our office       Thank you,    [address] [facility],[address]  [id number] [place] [address]/Lynette Bronson 15  16[facility]/Citlalli Gastelum [address] [place] [address]/[address]  [phone]

## 2020-08-20 ENCOUNTER — PATIENT OUTREACH (OUTPATIENT)
Dept: OBGYN CLINIC | Facility: CLINIC | Age: 30
End: 2020-08-20

## 2020-08-26 ENCOUNTER — PATIENT OUTREACH (OUTPATIENT)
Dept: OBGYN CLINIC | Facility: CLINIC | Age: 30
End: 2020-08-26

## 2020-08-26 NOTE — PROGRESS NOTES
GRETA spoke with 28 y/o-S- G3:P0:AB2-  Algerian speaking woman for psychosocial assess  Pt resides with uncle, cousin and cousin's wife  Pt states pregnancy was not intended but welcome  FOB aware and involved Pt denies any usage of drug, alcohol or smoking  No mental health or domestic violence issues  Pt has [address] [facility] and need to call [facility] for appointment  Pt is employed as   Repots uses taxi to keep her appointments  Pt claimed her main support is FOB and the family she resides with  Pt main concern was getting her colace and was advice to buy it a the closest pharmacy and to give that pharamacy name to the provider next time  SW explained her role and gave contact information   Pt was encouraged to call GRETA as needed

## 2020-08-28 ENCOUNTER — APPOINTMENT (OUTPATIENT)
Dept: LAB | Facility: CLINIC | Age: 30
End: 2020-08-28

## 2020-08-28 ENCOUNTER — TRANSCRIBE ORDERS (OUTPATIENT)
Dept: LAB | Facility: CLINIC | Age: 30
End: 2020-08-28

## 2020-08-28 DIAGNOSIS — Z34.91 FIRST TRIMESTER PREGNANCY: ICD-10-CM

## 2020-08-28 LAB
ABO GROUP BLD: NORMAL
BACTERIA UR QL AUTO: ABNORMAL /HPF
BASOPHILS # BLD AUTO: 0.01 THOUSANDS/ΜL (ref 0–0.1)
BASOPHILS NFR BLD AUTO: 0 % (ref 0–1)
BILIRUB UR QL STRIP: NEGATIVE
BLD GP AB SCN SERPL QL: NEGATIVE
CLARITY UR: CLEAR
COLOR UR: YELLOW
EOSINOPHIL # BLD AUTO: 0.05 THOUSAND/ΜL (ref 0–0.61)
EOSINOPHIL NFR BLD AUTO: 1 % (ref 0–6)
ERYTHROCYTE [DISTWIDTH] IN BLOOD BY AUTOMATED COUNT: 15.9 % (ref 11.6–15.1)
GLUCOSE UR STRIP-MCNC: NEGATIVE MG/DL
HBV SURFACE AG SER QL: NORMAL
HCT VFR BLD AUTO: 40.3 % (ref 34.8–46.1)
HGB BLD-MCNC: 12.9 G/DL (ref 11.5–15.4)
HGB UR QL STRIP.AUTO: ABNORMAL
IMM GRANULOCYTES # BLD AUTO: 0.02 THOUSAND/UL (ref 0–0.2)
IMM GRANULOCYTES NFR BLD AUTO: 0 % (ref 0–2)
KETONES UR STRIP-MCNC: ABNORMAL MG/DL
LEUKOCYTE ESTERASE UR QL STRIP: ABNORMAL
LYMPHOCYTES # BLD AUTO: 2.03 THOUSANDS/ΜL (ref 0.6–4.47)
LYMPHOCYTES NFR BLD AUTO: 27 % (ref 14–44)
MCH RBC QN AUTO: 25.1 PG (ref 26.8–34.3)
MCHC RBC AUTO-ENTMCNC: 32 G/DL (ref 31.4–37.4)
MCV RBC AUTO: 79 FL (ref 82–98)
MONOCYTES # BLD AUTO: 0.43 THOUSAND/ΜL (ref 0.17–1.22)
MONOCYTES NFR BLD AUTO: 6 % (ref 4–12)
NEUTROPHILS # BLD AUTO: 5.08 THOUSANDS/ΜL (ref 1.85–7.62)
NEUTS SEG NFR BLD AUTO: 66 % (ref 43–75)
NITRITE UR QL STRIP: NEGATIVE
NON-SQ EPI CELLS URNS QL MICRO: ABNORMAL /HPF
NRBC BLD AUTO-RTO: 0 /100 WBCS
PH UR STRIP.AUTO: 6 [PH]
PLATELET # BLD AUTO: 372 THOUSANDS/UL (ref 149–390)
PMV BLD AUTO: 9.6 FL (ref 8.9–12.7)
PROT UR STRIP-MCNC: NEGATIVE MG/DL
RBC # BLD AUTO: 5.13 MILLION/UL (ref 3.81–5.12)
RBC #/AREA URNS AUTO: ABNORMAL /HPF
RH BLD: POSITIVE
RPR SER QL: NORMAL
RUBV IGG SERPL IA-ACNC: 73.2 IU/ML
SP GR UR STRIP.AUTO: 1.02 (ref 1–1.03)
SPECIMEN EXPIRATION DATE: NORMAL
UROBILINOGEN UR QL STRIP.AUTO: 0.2 E.U./DL
WBC # BLD AUTO: 7.62 THOUSAND/UL (ref 4.31–10.16)
WBC #/AREA URNS AUTO: ABNORMAL /HPF

## 2020-08-28 PROCEDURE — 36415 COLL VENOUS BLD VENIPUNCTURE: CPT

## 2020-08-28 PROCEDURE — 81001 URINALYSIS AUTO W/SCOPE: CPT

## 2020-08-28 PROCEDURE — 87086 URINE CULTURE/COLONY COUNT: CPT

## 2020-08-28 PROCEDURE — 80081 OBSTETRIC PANEL INC HIV TSTG: CPT

## 2020-08-29 LAB
BACTERIA UR CULT: NORMAL
HIV 1+2 AB+HIV1 P24 AG SERPL QL IA: NORMAL

## 2020-09-01 ENCOUNTER — TELEPHONE (OUTPATIENT)
Dept: OBGYN CLINIC | Facility: CLINIC | Age: 30
End: 2020-09-01

## 2020-09-01 NOTE — TELEPHONE ENCOUNTER
----- Message from Abner Coombs [place] sent at 8/30/2020  8:57 PM EDT -----  Please call pt to inform that her STI blood work was negative, she is immune to rubella, her blood type is AB+, she is showing some ketones in her urine- please inquire if she is having nausea and vomiting or not eating  She needs to complete her 1 hr GTT    Thanks

## 2020-09-08 ENCOUNTER — ROUTINE PRENATAL (OUTPATIENT)
Dept: OBGYN CLINIC | Facility: CLINIC | Age: 30
End: 2020-09-08

## 2020-09-08 ENCOUNTER — APPOINTMENT (OUTPATIENT)
Dept: LAB | Facility: CLINIC | Age: 30
End: 2020-09-08

## 2020-09-08 VITALS
BODY MASS INDEX: 36.54 KG/M2 | TEMPERATURE: 98.2 F | WEIGHT: 193.4 LBS | DIASTOLIC BLOOD PRESSURE: 77 MMHG | SYSTOLIC BLOOD PRESSURE: 114 MMHG | HEART RATE: 98 BPM | RESPIRATION RATE: 16 BRPM

## 2020-09-08 DIAGNOSIS — Z34.91 FIRST TRIMESTER PREGNANCY: ICD-10-CM

## 2020-09-08 DIAGNOSIS — O99.810 ABNORMAL GLUCOSE AFFECTING PREGNANCY: ICD-10-CM

## 2020-09-08 DIAGNOSIS — Z3A.12 12 WEEKS GESTATION OF PREGNANCY: Primary | ICD-10-CM

## 2020-09-08 LAB — GLUCOSE 1H P 50 G GLC PO SERPL-MCNC: 146 MG/DL

## 2020-09-08 PROCEDURE — 99213 OFFICE O/P EST LOW 20 MIN: CPT | Performed by: FAMILY MEDICINE

## 2020-09-08 PROCEDURE — 36415 COLL VENOUS BLD VENIPUNCTURE: CPT

## 2020-09-08 PROCEDURE — 82950 GLUCOSE TEST: CPT

## 2020-09-08 PROCEDURE — 87591 N.GONORRHOEAE DNA AMP PROB: CPT | Performed by: FAMILY MEDICINE

## 2020-09-08 PROCEDURE — 87491 CHLMYD TRACH DNA AMP PROBE: CPT | Performed by: FAMILY MEDICINE

## 2020-09-08 NOTE — PROGRESS NOTES
Patient is a  here for initial prenatal H&P  This is not an intended pregnancy  Patient reports that her LMP was 20  Patient is currently doing well, but complaining of some bilateral low back pain that started early on in the pregnancy  She reports a similar pain in her previous pregnancies  She denies any dysuria, urine frequency, hematuria, vaginal bleeding, itching or discharge  She is here alone  Her first two pregnancies were miscarried per patient  She reports the first to be some time within the first trimester and the second to be within the first month before she went for her first prenatal visit  She currently works as a   She has a good support system at home  Vitals:    20 1509   BP: 114/77   Pulse: 98   Resp: 16   Temp: 98 2 °F (36 8 °C)        /77 (BP Location: Right arm, Patient Position: Sitting, Cuff Size: Large)   Pulse 98   Temp 98 2 °F (36 8 °C)   Resp 16   Wt 87 7 kg (193 lb 6 4 oz)   LMP 2020 (Exact Date)   BMI 36 54 kg/m²     General:   alert and oriented, in no acute distress, alert, appears stated age and cooperative   Heart: regular rate and rhythm, S1, S2 normal, no murmur, click, rub or gallop   Lungs: clear to auscultation bilaterally   Abdomen: soft, non-tender, without masses or organomegaly   Vulva: normal                         Assessment and Plan    1  LMP 2020 with an NEENA 3/23/2021    2  Transvaginal ultrasound showed  NEENA 3/22/21  3  Pap smear 2019  4  GC/CT ordered today  5  Prenatal labs completed on   1hr glucose was 146, needs to complete 3 hour glucose test  6  Postpartum: patient plans on breastfeeding  Different methods of contraception were discussed with patient, including progesterone only oral pills, depo provera, nexplanon, mirena, and paragard  Patient would like to use oral pills  8  Plan for quad screen at next visit  9   Labor expectations discussed with patient, including appointment schedule, nutrition, weight gain, sexual intercourse, and nausea and vomiting  10  RTC in 4 weeks       D/w Dr Nadja Adair

## 2020-09-08 NOTE — PATIENT INSTRUCTIONS
El embarazo de la semana 11 a la 14   LO QUE NECESITA SABER:   Ahora usted está al término del primer trimestre y entrando al mariam trimestre  Los [employer] matutinos por lo general desaparecen para [employer]  Es posible que usted tenga otros síntomas boni fatiga, orinar con frecuencia y madhav de [place]  Usted podría harish [employer] 2 a 4 libras hasta ahora  INSTRUCCIONES SOBRE EL JASWANT HOSPITALARIA:   Regrese a la rose marie de emergencias si:   · Usted tiene dolor o cólicos en el abdomen o la parte baja de la espalda  · Usted tiene sangrado vaginal abundante o coágulos  · Le sale un material que parece tejido o coágulos grandes  Recolecte el material y tráigalo con usted  Pregúntele a wheatley Barron Parody vitaminas y minerales son adecuados para usted  · Usted no puede retener alimentos ni líquidos y está perdiendo [place]  · Usted tiene un sangrado leve  · Usted tiene escalofríos o fiebre  · Usted tiene comezón, ardor o dolor vaginal      · Usted tiene nadia secreción vaginal amarillenta, verdosa, tori o de [employer]  · Usted tiene dolor o ardor al Darnella Cowing, orina menos de lo habitual o tiene [place] rosada o sanguinolenta  · Usted tiene preguntas o inquietudes acerca de wheatley condición o cuidado  Cómo cuidarse en esta etapa de wheatley embarazo:   · Descanse lo suficiente  Es posible que usted se sienta más cansada de lo normal  Usted podría necesitar merly siestas o acostarse más temprano  · Controle la náusea y el vómito  Evite los alimentos grasosos y picantes  Coma comidas pequeñas jessica el día en vez de porciones grandes  El jengibre puede ayudar a [employer]  Consulte con wheatley médico acerca de otras formas para disminuir las náuseas y el vómito  · Consuma alimentos saludables y variados  Alimentos saludables incluyen frutas, verduras, panes de dave integral, alimentos lácteos bajos en grasa, frijoles, manju magras y pescado  [place]n líquidos boni se le haya indicado  Pregunte cuánto líquido debe merly cada día y cuáles líquidos son los más adecuados para usted  Limite el consumo de cafeína a menos de [address]  Limite el consumo de pescado a 2 porciones cada semana  Escoja pescado con concentraciones bajas de candice boni atún al natural enlatado, camarón, salmón, bacalao o tilapia  No  coma pescado con concentraciones altas de candice boni pez tara, caballa gigante, pargo rayado y tiburón  · [address] [address] [place]  Wheatley necesidad de ciertas vitaminas y [facility], boni el ácido fólico, aumenta jessica el Estelita Frederick  Las vitaminas prenatales proporcionan algunas de las vitaminas y minerales adicionales que usted necesita  Las vitaminas prenatales también podrían ayudar a disminuir el riesgo de ciertos defectos de nacimiento  · No fume  Si usted fuma, nunca es demasiado tarde para dejar de hacerlo  Fumar aumenta el riesgo de aborto espontáneo y otros problemas de romel jessica wheatley Estelita Otoniel  Fumar puede causar que wheatley bebé nazca antes de tiempo o que pese menos al nacer  Solicite información a wheatley médico si usted necesita ayuda para dejar de fumar  · No consuma alcohol  El alcohol pasa de wheatley cuerpo al bebé a través de la placenta  Puede afectar el desarrollo del cerebro de wheatley bebé y provocar el síndrome de alcoholismo fetal (SAF)  FAS es un sammie de condiciones que causan [address], de comportamiento y de crecimiento  · Consulte con wheatley médico antes de merly cualquier medicamento  Muchos medicamentos pueden perjudicar a wheatley bebé si usted los dora [address]  No tome ningún medicamento, vitaminas, hierbas o suplementos sin christiano consultar con wheatley Carson Sensor  use drogas ilegales o de la lucio (boni marihuana o cocaína) mientras está embarazada  Consejos de seguridad jessica el embarazo:   · Evite jacuzzis y saunas    No use un jacuzzi o un sauna mientras usted está embarazada, especialmente jessica el primer trimestre  Los [facility] y los saunas aumentan la temperatura de dangelo bebé y el riesgo de defectos de nacimiento  · Evite la toxoplasmosis  [place] es nadia infección causada por comer carne cruda o estar cerca del excremento de un victoriano infectado  [place] puede causar malformaciones congénitas, aborto espontáneo y Isreal Schein  Lávese las [place] después de tocar carne cruda  Asegúrese de que la carne esté cindi cocida antes de comerla  Evite los huevos crudos y la [place]  Use guantes o pida que alguien la ayude a limpiar la caja de arena del victoriano mientras kobi Limon  Cambios que están ocurriendo con dangelo bebé: Dangelo bebé tiene completamente formadas las uñas de saba [place] y pies  Ahora dangelo latido se puede escuchar  Pregunte a dangelo médico si usted puede escuchar el latido cardíaco de dangelo bebé  Para la semana 14, dangelo bebé mide más de 4 pulgadas desde la punta de la antonio hasta la rabadilla (parte inferior del bebé)  Dangelo bebé pesa más de 3 onzas  Lo que necesita saber acerca del cuidado prenatal:  Jessica las primeras 29 semanas de dangelo embarazo, kobi tendrá citas mensuales con dangelo médico  El cuidado prenatal puede ayudar a evitar problemas jessica el [place] Sherin y Jenae  Dangelo médico le revisará dangelo presión arterial y [place]  Es posible que también necesite alguno de los siguientes tratamientos:  · Un examen de orina  también podría realizarse para revisarle el azúcar y la proteína  Estas son señales de diabetes gestacional o de infección  · Se le pueden ofrecer  pruebas de detección de trastornos genéticos  Estos exámenes de detección revisan el riesgo de dangelo bebé de trastornos genéticos boni el síndrome de [place]  Los exámenes de detección incluyen un examen de nelly y un ultrasonido  · El ritmo cardíaco de dangelo bebé  será revisado  © 2017 [address] Information is for End User's use only and may not be sold, redistributed or otherwise used for commercial purposes   All illustrations and images included in CareNotes® are the copyrighted property of A D A M , Inc  or Isai Dickey  Esta información es sólo para uso en educación  Wheatley intención no es darle un consejo médico sobre enfermedades o tratamientos  Colsulte con wheatley Rajani Antis farmacéutico antes de seguir cualquier régimen médico para saber si es seguro y efectivo para usted

## 2020-09-10 LAB
C TRACH DNA SPEC QL NAA+PROBE: NEGATIVE
N GONORRHOEA DNA SPEC QL NAA+PROBE: NEGATIVE

## 2020-09-14 ENCOUNTER — TELEPHONE (OUTPATIENT)
Dept: OBGYN CLINIC | Facility: CLINIC | Age: 30
End: 2020-09-14

## 2020-09-14 NOTE — TELEPHONE ENCOUNTER
----- Message from Farhan Alamo DO sent at 9/10/2020 11:04 AM EDT -----  Patient is Polish speaking only, and does not have myChart  Could someone please reach out to this patient to inform her of her negative GC/Chlamydia results  Thank you, Dr Ed Ocasio

## 2020-09-15 ENCOUNTER — TELEPHONE (OUTPATIENT)
Dept: PERINATAL CARE | Facility: CLINIC | Age: 30
End: 2020-09-15

## 2020-09-15 NOTE — TELEPHONE ENCOUNTER
Spoke with patient and confirmed appointment with MFM  1 support person ( must be over age of 15) may accompany patient  Will you and your support person be able to wear a mask ,without a valve , during entire appointment? YES   To minimize your exposure in our waiting area,check in and rooming questions will be done via phone  When you arrive in the parking lot please call the following inside line # prior to entering office:    [facility] [phone] line: [facility] line:  [address] line: [phone]  [place] line:  [phone]  [place] line: [phone]    Have you or your support person traveled outside the state in the last 2 weeks? NO   If yes, what state did you travel to? YES     Do you or your support person have:  Fever or flu- like symptoms? NO  Symptoms of upper respiratory infection like runny nose, sore throat or cough? NO  Do you have new headache that you have not had in the past?NO  Have you experienced any new shortness of breath recently? NO  Do you have any new loss of taste or smell? NO  Do you have any new diarrhea, nausea or vomiting? NO  Have you recently been in contact with anyone who has been sick or diagnosed with COVID-19 infection? NO  Have you been recommended to quarantine because of an exposure to a confirmed positive COVID19 person? NO  You and your support person will have temperature screening upon arrival   Patient verbalized understanding of all instructions   -------------------------------------------------------------    Attempted to reach patient by phone and left voicemail to confirm appointment for MFM ultrasound  1 support person ( must be over the age of 15) may accompany you for your appointment  If you or your support person have traveled outside the state in the past 2 weeks, please call and notify our office today #[phone]    You and your support person must wear a mask ,covering nose and mouth,during your entire visit  You and your support person will have temperature screened upon arrival     To minimize your exposure in our waiting room, please call our office prior to entering the building  Check in and rooming questions will be done via phone  We will give you directions when to enter for your appointment  Inside office # provided:  Chantal Palma line: [phone]  [facility] line:  [phone]  [facility] line:  [address] line:  [phone]  Corey Myers line:  [phone]  [place] line:  [phone]    IF you are not feeling well- cough, fever, shortness of breath or any flu like symptoms, contact your primary care physician or [facility] Yadi Daigle    Any questions with these instructions please call Maternal Fetal Medicine nurse line today @ # [phone]

## 2020-09-16 ENCOUNTER — ROUTINE PRENATAL (OUTPATIENT)
Dept: PERINATAL CARE | Facility: OTHER | Age: 30
End: 2020-09-16

## 2020-09-16 VITALS
WEIGHT: 192.9 LBS | HEIGHT: 61 IN | TEMPERATURE: 98.7 F | BODY MASS INDEX: 36.42 KG/M2 | HEART RATE: 88 BPM | SYSTOLIC BLOOD PRESSURE: 125 MMHG | DIASTOLIC BLOOD PRESSURE: 77 MMHG

## 2020-09-16 DIAGNOSIS — Z86.32 HISTORY OF GESTATIONAL DIABETES IN PRIOR PREGNANCY, CURRENTLY PREGNANT IN FIRST TRIMESTER: ICD-10-CM

## 2020-09-16 DIAGNOSIS — Z3A.13 13 WEEKS GESTATION OF PREGNANCY: ICD-10-CM

## 2020-09-16 DIAGNOSIS — Z34.91 FIRST TRIMESTER PREGNANCY: ICD-10-CM

## 2020-09-16 DIAGNOSIS — Z36.82 NUCHAL TRANSLUCENCY OF FETUS ON PRENATAL ULTRASOUND: ICD-10-CM

## 2020-09-16 DIAGNOSIS — O99.810 ABNORMAL GLUCOSE AFFECTING PREGNANCY: ICD-10-CM

## 2020-09-16 DIAGNOSIS — O26.20 PREVIOUS RECURRENT MISCARRIAGES AFFECTING PREGNANCY, ANTEPARTUM: Primary | ICD-10-CM

## 2020-09-16 DIAGNOSIS — O99.211 OBESITY AFFECTING PREGNANCY IN FIRST TRIMESTER: ICD-10-CM

## 2020-09-16 DIAGNOSIS — E03.8 SUBCLINICAL HYPOTHYROIDISM: ICD-10-CM

## 2020-09-16 DIAGNOSIS — Z3A.14 14 WEEKS GESTATION OF PREGNANCY: ICD-10-CM

## 2020-09-16 DIAGNOSIS — O09.291 HISTORY OF GESTATIONAL DIABETES IN PRIOR PREGNANCY, CURRENTLY PREGNANT IN FIRST TRIMESTER: ICD-10-CM

## 2020-09-16 PROCEDURE — 99241 PR OFFICE CONSULTATION NEW/ESTAB PATIENT 15 MIN: CPT | Performed by: OBSTETRICS & GYNECOLOGY

## 2020-09-16 PROCEDURE — 76801 OB US < 14 WKS SINGLE FETUS: CPT | Performed by: OBSTETRICS & GYNECOLOGY

## 2020-09-16 PROCEDURE — 76813 OB US NUCHAL MEAS 1 GEST: CPT | Performed by: OBSTETRICS & GYNECOLOGY

## 2020-09-16 RX ORDER — ASPIRIN 81 MG/1
162 TABLET, CHEWABLE ORAL DAILY
Qty: 60 TABLET | Refills: 6 | Status: SHIPPED | OUTPATIENT
Start: 2020-09-16

## 2020-09-16 NOTE — Clinical Note
This patient and partner speaks [place]  They have no insurance  I thought normally these patients me with somebody threw social work to help them navigate getting medical assistance through the [facility] office  Can you send this message to whoever is in charge of this in your [place] office  This couple made it sound like they have not met anyone yet to help them apply for medical assistance       Shereen Vazquez MD

## 2020-09-16 NOTE — LETTER
2020     Jacob Almendarez, [address] [id number]    Patient: Dagmar Graff   YOB: 1990   Date of Visit: 2020       Dear Dr Paula Levin: Thank you for referring Sindy EspinozaReyes to me for evaluation  Below are my notes for this consultation  If you have questions, please do not hesitate to call me  I look forward to following your patient along with you  Sincerely,        Lindajo Alpers, MD        CC: No Recipients  Lindajo Alpers, MD  2020  7:51 PM  Sign when Signing Visit  OFFICE CONSULT    Dear Shadi Byrnes     Thank you for requesting a  consultation on your patient Ms  Sindy EspinozaReyes for the following indications: sequential screen    History Lynsey was at today's visit with her partner Charlette Esquivel  I used the language line to  this patient [id number]  She is on prenatal vitamins and has no allergies  She is being followed medically for increased BMI  Her surgical history includes a cyst removal in the area of the vagina  She also reports a history of an abnormal TSH and 2 prior miscarriages  She reports a family history of diabetes and cancer  She denies any use of cigarettes, alcohol or illicit drugs  She reports a personal history of hypertension  At the time of her last miscarriage she was found to have an elevated fbs of 103  A hemoglobin A1c at that same time was 6 2 in May of 2019  Her miscarriages occurred at 11 weeks on 19 and at 8 weeks in 2020  An early Glucola in this pregnancy was abnormal at 146  Ultrasound findings:  Her ultrasound shows a fetus that is growing concordant with her dates  The nasal bone and nuchal translucency appear normal   No malformations are detected on today's early ultrasound  A small probable intramural fibroid measuring 2 6 by 1 7 x 2 5 centimeter is in the area behind the placenta       The patient was informed of the findings and counseled about the limitations of the exam in detecting all forms of fetal congenital abnormalities  She denies any vaginal bleeding or uterine cramping or contractions  Today's ultrasound findings and suggested follow-up were discussed in detail with the patient  Genetic screening was discussed and they are interested in testing however they currently have no insurance  Follow up recommended:   1  Fetal Level II ultrasound imaging is recommended at 19-20 weeks' gestation  2  She was started on aspirin to decrease her risk for developing preeclampsia  3  She was given lab slip for thyroid studies  She had an elevated Glucola in this pregnancy and has a lab slip for a 3 hour glucose tolerance test and hemoglobin A1c  4  She currently has no insurance  Will send this message to her   5  Patient and partner were offer genetic screening but currently have no insurance and report they have no means to pay for it without insurance  Recommend her OB office offering genetic screening (quad screen) once she is covered by medical assistance  6  BMI above 30 confers increased pregnancy risks  Maternal risks include preeclampsia, gestational diabetes, cardiac dysfunction, and sleep apnea  Fetal risks include miscarriage, fetal anomalies, and stillbirth as well as macrosomia and impaired growth  Intrapartum risks include  delivery, wound complications, and venous thrombosis  The  detection of congenital anomalies is reduced with increasing maternal BMI  Strategies to limit weight gain to the [place] of Medicine guidelines, which for BMI>30 is 11-20 pounds, include dietary control, exercise, and behavior modification  The [address] and Human Services recommends 150 minutes per week of aerobic exercise in pregnancy, which could be divided into 30 minutes daily, five times per week  7  I did not review this fibroid found on today's ultrasound with the parents    This will need to be reviewed with her next ultrasound  This fibroid is small and intramural and would not be the cause of a recurrent miscarriage         The face to face time, in addition to time spent discussing ultrasound results, was approximately 15 minutes, greater than 50% of which was spent during counseling and coordination of care    Gama Masters MD

## 2020-09-16 NOTE — PROGRESS NOTES
OFFICE CONSULT    Dear Carol Aguilar     Thank you for requesting a  consultation on your patient Ms Sindy EspinozaReyes for the following indications: sequential screen    History Lynsey was at today's visit with her partner Gordon Shaver  I used the language line to  this patient [id number]  She is on prenatal vitamins and has no allergies  She is being followed medically for increased BMI  Her surgical history includes a cyst removal in the area of the vagina  She also reports a history of an abnormal TSH and 2 prior miscarriages  She reports a family history of diabetes and cancer  She denies any use of cigarettes, alcohol or illicit drugs  She reports a personal history of hypertension  At the time of her last miscarriage she was found to have an elevated fbs of 103  A hemoglobin A1c at that same time was 6 2 in May of 2019  Her miscarriages occurred at 11 weeks on 19 and at 8 weeks in 2020  An early Glucola in this pregnancy was abnormal at 146  Ultrasound findings:  Her ultrasound shows a fetus that is growing concordant with her dates  The nasal bone and nuchal translucency appear normal   No malformations are detected on today's early ultrasound  A small probable intramural fibroid measuring 2 6 by 1 7 x 2 5 centimeter is in the area behind the placenta  The patient was informed of the findings and counseled about the limitations of the exam in detecting all forms of fetal congenital abnormalities  She denies any vaginal bleeding or uterine cramping or contractions  Today's ultrasound findings and suggested follow-up were discussed in detail with the patient  Genetic screening was discussed and they are interested in testing however they currently have no insurance  Follow up recommended:   1  Fetal Level II ultrasound imaging is recommended at 19-20 weeks' gestation  2  She was started on aspirin to decrease her risk for developing preeclampsia  3    She was given lab slip for thyroid studies  She had an elevated Glucola in this pregnancy and has a lab slip for a 3 hour glucose tolerance test and hemoglobin A1c  4  She currently has no insurance  Will send this message to her   5  Patient and partner were offer genetic screening but currently have no insurance and report they have no means to pay for it without insurance  Recommend her OB office offering genetic screening (quad screen) once she is covered by medical assistance  6  BMI above 30 confers increased pregnancy risks  Maternal risks include preeclampsia, gestational diabetes, cardiac dysfunction, and sleep apnea  Fetal risks include miscarriage, fetal anomalies, and stillbirth as well as macrosomia and impaired growth  Intrapartum risks include  delivery, wound complications, and venous thrombosis  The  detection of congenital anomalies is reduced with increasing maternal BMI  Strategies to limit weight gain to the [place] of Medicine guidelines, which for BMI>30 is 11-20 pounds, include dietary control, exercise, and behavior modification  The [facility] and Human Services recommends 150 minutes per week of aerobic exercise in pregnancy, which could be divided into 30 minutes daily, five times per week  7  I did not review this fibroid found on today's ultrasound with the parents  This will need to be reviewed with her next ultrasound  This fibroid is small and intramural and would not be the cause of a recurrent miscarriage         The face to face time, in addition to time spent discussing ultrasound results, was approximately 15 minutes, greater than 50% of which was spent during counseling and coordination of care    Jossy Tripp MD

## 2020-09-21 ENCOUNTER — APPOINTMENT (OUTPATIENT)
Dept: LAB | Facility: CLINIC | Age: 30
End: 2020-09-21

## 2020-09-21 DIAGNOSIS — O26.20 PREVIOUS RECURRENT MISCARRIAGES AFFECTING PREGNANCY, ANTEPARTUM: ICD-10-CM

## 2020-09-21 DIAGNOSIS — O99.810 ABNORMAL GLUCOSE AFFECTING PREGNANCY: ICD-10-CM

## 2020-09-21 DIAGNOSIS — Z3A.13 13 WEEKS GESTATION OF PREGNANCY: ICD-10-CM

## 2020-09-21 LAB
EST. AVERAGE GLUCOSE BLD GHB EST-MCNC: 120 MG/DL
GLUCOSE P FAST SERPL-MCNC: 100 MG/DL (ref 65–99)
HBA1C MFR BLD: 5.8 %
T4 FREE SERPL-MCNC: 1.01 NG/DL (ref 0.76–1.46)
TSH SERPL DL<=0.05 MIU/L-ACNC: 3.8 UIU/ML (ref 0.36–3.74)

## 2020-09-21 PROCEDURE — 84439 ASSAY OF FREE THYROXINE: CPT | Performed by: OBSTETRICS & GYNECOLOGY

## 2020-09-21 PROCEDURE — 82951 GLUCOSE TOLERANCE TEST (GTT): CPT

## 2020-09-21 PROCEDURE — 84443 ASSAY THYROID STIM HORMONE: CPT | Performed by: OBSTETRICS & GYNECOLOGY

## 2020-09-21 PROCEDURE — 83036 HEMOGLOBIN GLYCOSYLATED A1C: CPT | Performed by: OBSTETRICS & GYNECOLOGY

## 2020-09-21 PROCEDURE — 36415 COLL VENOUS BLD VENIPUNCTURE: CPT | Performed by: OBSTETRICS & GYNECOLOGY

## 2020-09-27 RX ORDER — LEVOTHYROXINE SODIUM 0.03 MG/1
25 TABLET ORAL DAILY
Qty: 30 TABLET | Refills: 9 | Status: SHIPPED | OUTPATIENT
Start: 2020-09-27 | End: 2020-10-19

## 2020-09-28 NOTE — RESULT ENCOUNTER NOTE
Please call with results of an abnormal 3hr ogtt  And an abnormal TSH  She will need a visit scheduled with diabetes education( referral ordered) and she will need to start levothyroxine 25 mcg daily after she picks it up at the pharmacy  In 1 month she will need to get another tsh to see if her dosage needs changed  I will have her repeat tsh ordered for her to get drawn at any [facility] lab       Darryl Sewell MD

## 2020-09-29 ENCOUNTER — TELEPHONE (OUTPATIENT)
Dept: PERINATAL CARE | Facility: CLINIC | Age: 30
End: 2020-09-29

## 2020-09-29 NOTE — TELEPHONE ENCOUNTER
I called Lynsey using the language line with Kathy Holley,  #[phone] to review lab results with her  I verified that she already has an appointment with the diabetes in pregnancy program due to her elevated 3 hour gtt  She confirms this appointment and states that she was aware that her 3 hour gtt was elevated  I then reviewed the abnormal TSH result  I informed her that Dr Imer Marks ordered for her to start on Levothyroxine  I told her that the prescription is at [facility] in [facility]  I told her that she needs to take this first thing in the morning on an empty stomach  Also, I informed her that Dr Imer Marks would like her to have the test repeated in a month to check the level and to see if the dose needs to be adjusted  I told her that the order is in the [employer] system so that a paper order is not needed and she can go to any [facility] lab to have this drawn  She agrees to  the prescription and start taking it and have her repeat lab drawn in a month  I gave her the nurse line phone number ([phone]) to call back if she has any questions

## 2020-09-29 NOTE — TELEPHONE ENCOUNTER
----- Message from Darryl Sewell MD sent at 9/27/2020  9:32 PM EDT -----  Please call with results of an abnormal 3hr ogtt  And an abnormal TSH  She will need a visit scheduled with diabetes education( referral ordered) and she will need to start levothyroxine 25 mcg daily after she picks it up at the pharmacy  In 1 month she will need to get another tsh to see if her dosage needs changed  I will have her repeat tsh ordered for her to get drawn at any [facility] lab       Darryl Sewell MD

## 2020-10-05 ENCOUNTER — DOCUMENTATION (OUTPATIENT)
Dept: PERINATAL CARE | Facility: CLINIC | Age: 30
End: 2020-10-05

## 2020-10-05 ENCOUNTER — TELEMEDICINE (OUTPATIENT)
Dept: PERINATAL CARE | Facility: CLINIC | Age: 30
End: 2020-10-05

## 2020-10-05 ENCOUNTER — ROUTINE PRENATAL (OUTPATIENT)
Dept: OBGYN CLINIC | Facility: CLINIC | Age: 30
End: 2020-10-05

## 2020-10-05 VITALS
HEART RATE: 100 BPM | SYSTOLIC BLOOD PRESSURE: 120 MMHG | WEIGHT: 191.4 LBS | DIASTOLIC BLOOD PRESSURE: 82 MMHG | RESPIRATION RATE: 16 BRPM | BODY MASS INDEX: 36.16 KG/M2 | TEMPERATURE: 98.4 F

## 2020-10-05 DIAGNOSIS — E66.09 CLASS 2 OBESITY DUE TO EXCESS CALORIES WITHOUT SERIOUS COMORBIDITY WITH BODY MASS INDEX (BMI) OF 36.0 TO 36.9 IN ADULT: ICD-10-CM

## 2020-10-05 DIAGNOSIS — O24.419 GESTATIONAL DIABETES MELLITUS (GDM), ANTEPARTUM, GESTATIONAL DIABETES METHOD OF CONTROL UNSPECIFIED: Primary | ICD-10-CM

## 2020-10-05 DIAGNOSIS — R73.09 ELEVATED HEMOGLOBIN A1C: ICD-10-CM

## 2020-10-05 DIAGNOSIS — O24.410 DIET CONTROLLED GESTATIONAL DIABETES MELLITUS (GDM) IN SECOND TRIMESTER: Primary | ICD-10-CM

## 2020-10-05 DIAGNOSIS — R79.89 ABNORMAL THYROID BLOOD TEST: ICD-10-CM

## 2020-10-05 DIAGNOSIS — O99.810 ABNORMAL GLUCOSE AFFECTING PREGNANCY: ICD-10-CM

## 2020-10-05 DIAGNOSIS — Z3A.15 15 WEEKS GESTATION OF PREGNANCY: ICD-10-CM

## 2020-10-05 DIAGNOSIS — Z34.92 SECOND TRIMESTER PREGNANCY: ICD-10-CM

## 2020-10-05 DIAGNOSIS — Z3A.14 14 WEEKS GESTATION OF PREGNANCY: ICD-10-CM

## 2020-10-05 PROCEDURE — 99213 OFFICE O/P EST LOW 20 MIN: CPT | Performed by: NURSE PRACTITIONER

## 2020-10-05 PROCEDURE — G0108 DIAB MANAGE TRN  PER INDIV: HCPCS | Performed by: DIETITIAN, REGISTERED

## 2020-10-06 ENCOUNTER — ULTRASOUND (OUTPATIENT)
Dept: PERINATAL CARE | Facility: OTHER | Age: 30
End: 2020-10-06

## 2020-10-06 ENCOUNTER — ROUTINE PRENATAL (OUTPATIENT)
Dept: OBGYN CLINIC | Facility: CLINIC | Age: 30
End: 2020-10-06

## 2020-10-06 ENCOUNTER — TELEPHONE (OUTPATIENT)
Dept: PERINATAL CARE | Facility: OTHER | Age: 30
End: 2020-10-06

## 2020-10-06 VITALS
TEMPERATURE: 98 F | DIASTOLIC BLOOD PRESSURE: 82 MMHG | SYSTOLIC BLOOD PRESSURE: 126 MMHG | WEIGHT: 190 LBS | BODY MASS INDEX: 35.87 KG/M2 | HEART RATE: 106 BPM | HEIGHT: 61 IN

## 2020-10-06 VITALS
TEMPERATURE: 98.6 F | WEIGHT: 191.4 LBS | BODY MASS INDEX: 36.14 KG/M2 | SYSTOLIC BLOOD PRESSURE: 110 MMHG | HEIGHT: 61 IN | DIASTOLIC BLOOD PRESSURE: 70 MMHG | HEART RATE: 99 BPM

## 2020-10-06 DIAGNOSIS — O24.419 GESTATIONAL DIABETES MELLITUS (GDM), ANTEPARTUM, GESTATIONAL DIABETES METHOD OF CONTROL UNSPECIFIED: ICD-10-CM

## 2020-10-06 DIAGNOSIS — O36.8390 ABNORMALITY IN FETAL HEART RATE/RHYTHM, ANTEPARTUM: Primary | ICD-10-CM

## 2020-10-06 DIAGNOSIS — R79.89 ABNORMAL THYROID BLOOD TEST: ICD-10-CM

## 2020-10-06 DIAGNOSIS — O36.8390 ABNORMALITY IN FETAL HEART RATE/RHYTHM, ANTEPARTUM: ICD-10-CM

## 2020-10-06 DIAGNOSIS — Z23 NEED FOR INFLUENZA VACCINATION: Primary | ICD-10-CM

## 2020-10-06 PROCEDURE — 76805 OB US >/= 14 WKS SNGL FETUS: CPT | Performed by: OBSTETRICS & GYNECOLOGY

## 2020-10-06 PROCEDURE — 90686 IIV4 VACC NO PRSV 0.5 ML IM: CPT

## 2020-10-06 PROCEDURE — 99213 OFFICE O/P EST LOW 20 MIN: CPT | Performed by: OBSTETRICS & GYNECOLOGY

## 2020-10-06 PROCEDURE — 99213 OFFICE O/P EST LOW 20 MIN: CPT | Performed by: NURSE PRACTITIONER

## 2020-10-06 PROCEDURE — 90471 IMMUNIZATION ADMIN: CPT

## 2020-10-07 ENCOUNTER — TELEPHONE (OUTPATIENT)
Dept: OBGYN CLINIC | Facility: CLINIC | Age: 30
End: 2020-10-07

## 2020-10-07 ENCOUNTER — TELEMEDICINE (OUTPATIENT)
Dept: PERINATAL CARE | Facility: CLINIC | Age: 30
End: 2020-10-07

## 2020-10-07 VITALS — BODY MASS INDEX: 36.06 KG/M2 | HEIGHT: 61 IN | WEIGHT: 191 LBS

## 2020-10-07 DIAGNOSIS — Z3A.16 16 WEEKS GESTATION OF PREGNANCY: ICD-10-CM

## 2020-10-07 DIAGNOSIS — E03.8 SUBCLINICAL HYPOTHYROIDISM: ICD-10-CM

## 2020-10-07 DIAGNOSIS — O99.212 OBESITY AFFECTING PREGNANCY IN SECOND TRIMESTER: ICD-10-CM

## 2020-10-07 DIAGNOSIS — O24.410 DIET CONTROLLED GESTATIONAL DIABETES MELLITUS (GDM) IN SECOND TRIMESTER: Primary | ICD-10-CM

## 2020-10-07 DIAGNOSIS — E66.09 CLASS 2 OBESITY DUE TO EXCESS CALORIES WITHOUT SERIOUS COMORBIDITY WITH BODY MASS INDEX (BMI) OF 36.0 TO 36.9 IN ADULT: ICD-10-CM

## 2020-10-07 PROCEDURE — 99215 OFFICE O/P EST HI 40 MIN: CPT | Performed by: NURSE PRACTITIONER

## 2020-10-07 RX ORDER — BLOOD SUGAR DIAGNOSTIC
STRIP MISCELLANEOUS
Qty: 150 EACH | Refills: 5 | Status: SHIPPED | OUTPATIENT
Start: 2020-10-07 | End: 2020-11-19 | Stop reason: SDUPTHER

## 2020-10-07 RX ORDER — LANCETS
EACH MISCELLANEOUS
Qty: 102 EACH | Refills: 5 | Status: SHIPPED | OUTPATIENT
Start: 2020-10-07 | End: 2020-11-19 | Stop reason: SDUPTHER

## 2020-10-07 RX ORDER — BLOOD-GLUCOSE METER
EACH MISCELLANEOUS
Qty: 1 KIT | Refills: 0 | Status: SHIPPED | OUTPATIENT
Start: 2020-10-07

## 2020-10-09 ENCOUNTER — TELEPHONE (OUTPATIENT)
Dept: PERINATAL CARE | Facility: OTHER | Age: 30
End: 2020-10-09

## 2020-10-09 ENCOUNTER — TELEPHONE (OUTPATIENT)
Dept: PERINATAL CARE | Facility: CLINIC | Age: 30
End: 2020-10-09

## 2020-10-12 ENCOUNTER — ROUTINE PRENATAL (OUTPATIENT)
Dept: PERINATAL CARE | Facility: OTHER | Age: 30
End: 2020-10-12

## 2020-10-12 ENCOUNTER — TRANSCRIBE ORDERS (OUTPATIENT)
Dept: LAB | Facility: CLINIC | Age: 30
End: 2020-10-12

## 2020-10-12 ENCOUNTER — LAB (OUTPATIENT)
Dept: LAB | Facility: CLINIC | Age: 30
End: 2020-10-12

## 2020-10-12 VITALS
DIASTOLIC BLOOD PRESSURE: 80 MMHG | WEIGHT: 193.34 LBS | TEMPERATURE: 97.4 F | BODY MASS INDEX: 36.5 KG/M2 | HEIGHT: 61 IN | HEART RATE: 93 BPM | SYSTOLIC BLOOD PRESSURE: 124 MMHG

## 2020-10-12 DIAGNOSIS — Z3A.16 16 WEEKS GESTATION OF PREGNANCY: ICD-10-CM

## 2020-10-12 DIAGNOSIS — O24.410 DIET CONTROLLED GESTATIONAL DIABETES MELLITUS (GDM) IN SECOND TRIMESTER: ICD-10-CM

## 2020-10-12 DIAGNOSIS — E66.09 CLASS 2 OBESITY DUE TO EXCESS CALORIES WITHOUT SERIOUS COMORBIDITY WITH BODY MASS INDEX (BMI) OF 36.0 TO 36.9 IN ADULT: ICD-10-CM

## 2020-10-12 DIAGNOSIS — O26.20 PREVIOUS RECURRENT MISCARRIAGES AFFECTING PREGNANCY, ANTEPARTUM: ICD-10-CM

## 2020-10-12 DIAGNOSIS — O36.8390 ABNORMALITY IN FETAL HEART RATE/RHYTHM, ANTEPARTUM: ICD-10-CM

## 2020-10-12 DIAGNOSIS — Z3A.15 15 WEEKS GESTATION OF PREGNANCY: ICD-10-CM

## 2020-10-12 DIAGNOSIS — Z3A.16 16 WEEKS GESTATION OF PREGNANCY: Primary | ICD-10-CM

## 2020-10-12 DIAGNOSIS — O99.212 OBESITY AFFECTING PREGNANCY IN SECOND TRIMESTER: ICD-10-CM

## 2020-10-12 DIAGNOSIS — Z3A.14 14 WEEKS GESTATION OF PREGNANCY: ICD-10-CM

## 2020-10-12 DIAGNOSIS — E03.8 SUBCLINICAL HYPOTHYROIDISM: ICD-10-CM

## 2020-10-12 DIAGNOSIS — O36.8390 FETAL BRADYCARDIA, ANTEPARTUM CONDITION OR COMPLICATION: ICD-10-CM

## 2020-10-12 PROBLEM — R73.09 ABNORMAL GLUCOSE: Status: RESOLVED | Noted: 2019-05-14 | Resolved: 2020-10-12

## 2020-10-12 LAB
EST. AVERAGE GLUCOSE BLD GHB EST-MCNC: 117 MG/DL
HBA1C MFR BLD: 5.7 %
TSH SERPL DL<=0.05 MIU/L-ACNC: 3.51 UIU/ML (ref 0.36–3.74)

## 2020-10-12 PROCEDURE — 82677 ASSAY OF ESTRIOL: CPT

## 2020-10-12 PROCEDURE — 86336 INHIBIN A: CPT

## 2020-10-12 PROCEDURE — 76827 ECHO EXAM OF FETAL HEART: CPT | Performed by: OBSTETRICS & GYNECOLOGY

## 2020-10-12 PROCEDURE — 83036 HEMOGLOBIN GLYCOSYLATED A1C: CPT

## 2020-10-12 PROCEDURE — 84702 CHORIONIC GONADOTROPIN TEST: CPT

## 2020-10-12 PROCEDURE — 82105 ALPHA-FETOPROTEIN SERUM: CPT

## 2020-10-12 PROCEDURE — 36415 COLL VENOUS BLD VENIPUNCTURE: CPT

## 2020-10-12 PROCEDURE — 76815 OB US LIMITED FETUS(S): CPT | Performed by: OBSTETRICS & GYNECOLOGY

## 2020-10-12 PROCEDURE — 86235 NUCLEAR ANTIGEN ANTIBODY: CPT

## 2020-10-12 PROCEDURE — 99213 OFFICE O/P EST LOW 20 MIN: CPT | Performed by: OBSTETRICS & GYNECOLOGY

## 2020-10-12 PROCEDURE — 84443 ASSAY THYROID STIM HORMONE: CPT

## 2020-10-13 LAB
ENA SS-A AB SER-ACNC: <0.2 AI (ref 0–0.9)
ENA SS-B AB SER-ACNC: <0.2 AI (ref 0–0.9)

## 2020-10-14 ENCOUNTER — TELEPHONE (OUTPATIENT)
Dept: PERINATAL CARE | Facility: CLINIC | Age: 30
End: 2020-10-14

## 2020-10-15 LAB
2ND TRIMESTER 4 SCREEN SERPL-IMP: NORMAL
2ND TRIMESTER 4 SCREEN SERPL-IMP: NORMAL
AFP ADJ MOM SERPL: 0.9
AFP SERPL-MCNC: 28.9 NG/ML
AGE AT DELIVERY: 31.1 YR
FET TS 18 RISK FROM MAT AGE: NORMAL
FET TS 21 RISK FROM MAT AGE: 604
GA METHOD: NORMAL
GA: 16.9 WEEKS
HCG ADJ MOM SERPL: 0.36
HCG SERPL-ACNC: NORMAL MIU/ML
IDDM PATIENT QL: NO
INHIBIN A ADJ MOM SERPL: 0.72
INHIBIN A SERPL-MCNC: 100.76 PG/ML
KARYOTYP BLD/T: NORMAL
MULTIPLE PREGNANCY: NO
NEURAL TUBE DEFECT RISK FETUS: NORMAL %
SERVICE CMNT-IMP: NORMAL
TS 18 RISK FETUS: NORMAL
TS 21 RISK FETUS: NORMAL
U ESTRIOL ADJ MOM SERPL: 1.33
U ESTRIOL SERPL-MCNC: 1.45 NG/ML

## 2020-10-19 DIAGNOSIS — Z3A.17 17 WEEKS GESTATION OF PREGNANCY: Primary | ICD-10-CM

## 2020-10-19 DIAGNOSIS — E03.8 SUBCLINICAL HYPOTHYROIDISM: ICD-10-CM

## 2020-10-19 DIAGNOSIS — Z3A.14 14 WEEKS GESTATION OF PREGNANCY: ICD-10-CM

## 2020-10-19 DIAGNOSIS — O26.20 PREVIOUS RECURRENT MISCARRIAGES AFFECTING PREGNANCY, ANTEPARTUM: ICD-10-CM

## 2020-10-19 DIAGNOSIS — Z3A.19 19 WEEKS GESTATION OF PREGNANCY: ICD-10-CM

## 2020-10-19 RX ORDER — LEVOTHYROXINE SODIUM 0.03 MG/1
50 TABLET ORAL DAILY
Qty: 60 TABLET | Refills: 9 | Status: SHIPPED | OUTPATIENT
Start: 2020-10-19

## 2020-10-21 ENCOUNTER — TELEMEDICINE (OUTPATIENT)
Dept: PERINATAL CARE | Facility: CLINIC | Age: 30
End: 2020-10-21

## 2020-10-21 ENCOUNTER — DOCUMENTATION (OUTPATIENT)
Dept: PERINATAL CARE | Facility: CLINIC | Age: 30
End: 2020-10-21

## 2020-10-21 DIAGNOSIS — O24.410 DIET CONTROLLED GESTATIONAL DIABETES MELLITUS (GDM) IN SECOND TRIMESTER: Primary | ICD-10-CM

## 2020-10-21 DIAGNOSIS — Z3A.18 18 WEEKS GESTATION OF PREGNANCY: ICD-10-CM

## 2020-10-21 DIAGNOSIS — O99.212 OBESITY AFFECTING PREGNANCY IN SECOND TRIMESTER: ICD-10-CM

## 2020-10-21 PROCEDURE — G0108 DIAB MANAGE TRN  PER INDIV: HCPCS | Performed by: DIETITIAN, REGISTERED

## 2020-10-26 ENCOUNTER — TELEPHONE (OUTPATIENT)
Dept: PERINATAL CARE | Facility: CLINIC | Age: 30
End: 2020-10-26

## 2020-10-27 ENCOUNTER — ROUTINE PRENATAL (OUTPATIENT)
Dept: PERINATAL CARE | Facility: OTHER | Age: 30
End: 2020-10-27

## 2020-10-27 VITALS
HEART RATE: 88 BPM | WEIGHT: 194.67 LBS | HEIGHT: 61 IN | BODY MASS INDEX: 36.75 KG/M2 | TEMPERATURE: 97.3 F | SYSTOLIC BLOOD PRESSURE: 127 MMHG | DIASTOLIC BLOOD PRESSURE: 80 MMHG

## 2020-10-27 DIAGNOSIS — O35.8XX0 ANOMALY OF HEART OF FETUS AFFECTING PREGNANCY, ANTEPARTUM, SINGLE OR UNSPECIFIED FETUS: Primary | ICD-10-CM

## 2020-10-27 PROCEDURE — 93325 DOPPLER ECHO COLOR FLOW MAPG: CPT | Performed by: PEDIATRICS

## 2020-10-27 PROCEDURE — 76827 ECHO EXAM OF FETAL HEART: CPT | Performed by: PEDIATRICS

## 2020-10-27 PROCEDURE — 76825 ECHO EXAM OF FETAL HEART: CPT | Performed by: PEDIATRICS

## 2020-10-28 ENCOUNTER — DOCUMENTATION (OUTPATIENT)
Dept: PERINATAL CARE | Facility: CLINIC | Age: 30
End: 2020-10-28

## 2020-10-28 DIAGNOSIS — E03.8 SUBCLINICAL HYPOTHYROIDISM: ICD-10-CM

## 2020-10-28 DIAGNOSIS — O99.212 OBESITY AFFECTING PREGNANCY IN SECOND TRIMESTER: ICD-10-CM

## 2020-10-28 DIAGNOSIS — O24.410 DIET CONTROLLED GESTATIONAL DIABETES MELLITUS (GDM) IN SECOND TRIMESTER: Primary | ICD-10-CM

## 2020-10-28 DIAGNOSIS — Z3A.19 19 WEEKS GESTATION OF PREGNANCY: ICD-10-CM

## 2020-10-29 ENCOUNTER — TELEPHONE (OUTPATIENT)
Dept: PERINATAL CARE | Facility: CLINIC | Age: 30
End: 2020-10-29

## 2020-10-30 ENCOUNTER — LAB (OUTPATIENT)
Dept: LAB | Facility: CLINIC | Age: 30
End: 2020-10-30

## 2020-10-30 ENCOUNTER — DOCUMENTATION (OUTPATIENT)
Dept: PERINATAL CARE | Facility: CLINIC | Age: 30
End: 2020-10-30

## 2020-10-30 ENCOUNTER — OFFICE VISIT (OUTPATIENT)
Dept: PERINATAL CARE | Facility: CLINIC | Age: 30
End: 2020-10-30

## 2020-10-30 VITALS
TEMPERATURE: 97.3 F | HEART RATE: 87 BPM | DIASTOLIC BLOOD PRESSURE: 70 MMHG | WEIGHT: 197 LBS | SYSTOLIC BLOOD PRESSURE: 108 MMHG | HEIGHT: 61 IN | BODY MASS INDEX: 37.19 KG/M2

## 2020-10-30 DIAGNOSIS — O99.212 OBESITY AFFECTING PREGNANCY IN SECOND TRIMESTER: ICD-10-CM

## 2020-10-30 DIAGNOSIS — Z3A.19 19 WEEKS GESTATION OF PREGNANCY: ICD-10-CM

## 2020-10-30 DIAGNOSIS — O24.414 INSULIN CONTROLLED GESTATIONAL DIABETES MELLITUS (GDM) IN SECOND TRIMESTER: Primary | ICD-10-CM

## 2020-10-30 DIAGNOSIS — E03.8 SUBCLINICAL HYPOTHYROIDISM: ICD-10-CM

## 2020-10-30 DIAGNOSIS — Z3A.17 17 WEEKS GESTATION OF PREGNANCY: ICD-10-CM

## 2020-10-30 LAB — TSH SERPL DL<=0.05 MIU/L-ACNC: 3.02 UIU/ML (ref 0.36–3.74)

## 2020-10-30 PROCEDURE — G0108 DIAB MANAGE TRN  PER INDIV: HCPCS | Performed by: DIETITIAN, REGISTERED

## 2020-10-30 PROCEDURE — 84443 ASSAY THYROID STIM HORMONE: CPT

## 2020-10-30 PROCEDURE — 36415 COLL VENOUS BLD VENIPUNCTURE: CPT

## 2020-11-02 ENCOUNTER — TELEPHONE (OUTPATIENT)
Dept: PERINATAL CARE | Facility: CLINIC | Age: 30
End: 2020-11-02

## 2020-11-02 RX ORDER — BLOOD SUGAR DIAGNOSTIC
STRIP MISCELLANEOUS
Qty: 100 EACH | Refills: 3 | Status: SHIPPED | OUTPATIENT
Start: 2020-11-02 | End: 2021-01-19 | Stop reason: SDUPTHER

## 2020-11-03 ENCOUNTER — ROUTINE PRENATAL (OUTPATIENT)
Dept: OBGYN CLINIC | Facility: CLINIC | Age: 30
End: 2020-11-03

## 2020-11-03 ENCOUNTER — DOCUMENTATION (OUTPATIENT)
Dept: PERINATAL CARE | Facility: CLINIC | Age: 30
End: 2020-11-03

## 2020-11-03 VITALS
DIASTOLIC BLOOD PRESSURE: 82 MMHG | SYSTOLIC BLOOD PRESSURE: 119 MMHG | BODY MASS INDEX: 36.47 KG/M2 | WEIGHT: 193.2 LBS | TEMPERATURE: 98 F | HEART RATE: 94 BPM | HEIGHT: 61 IN

## 2020-11-03 DIAGNOSIS — E03.8 SUBCLINICAL HYPOTHYROIDISM: ICD-10-CM

## 2020-11-03 DIAGNOSIS — O24.410 DIET CONTROLLED GESTATIONAL DIABETES MELLITUS (GDM) IN SECOND TRIMESTER: Primary | ICD-10-CM

## 2020-11-03 DIAGNOSIS — Z34.92 SECOND TRIMESTER PREGNANCY: ICD-10-CM

## 2020-11-03 DIAGNOSIS — Z3A.20 20 WEEKS GESTATION OF PREGNANCY: ICD-10-CM

## 2020-11-03 PROCEDURE — 99213 OFFICE O/P EST LOW 20 MIN: CPT | Performed by: NURSE PRACTITIONER

## 2020-11-12 ENCOUNTER — DOCUMENTATION (OUTPATIENT)
Dept: PERINATAL CARE | Facility: CLINIC | Age: 30
End: 2020-11-12

## 2020-11-12 DIAGNOSIS — O24.414 INSULIN CONTROLLED GESTATIONAL DIABETES MELLITUS (GDM) IN SECOND TRIMESTER: Primary | ICD-10-CM

## 2020-11-12 DIAGNOSIS — O99.212 OBESITY AFFECTING PREGNANCY IN SECOND TRIMESTER: ICD-10-CM

## 2020-11-12 DIAGNOSIS — E66.09 CLASS 2 OBESITY DUE TO EXCESS CALORIES WITHOUT SERIOUS COMORBIDITY WITH BODY MASS INDEX (BMI) OF 36.0 TO 36.9 IN ADULT: ICD-10-CM

## 2020-11-12 DIAGNOSIS — R73.03 PRE-DIABETES: ICD-10-CM

## 2020-11-12 DIAGNOSIS — Z3A.21 21 WEEKS GESTATION OF PREGNANCY: ICD-10-CM

## 2020-11-17 ENCOUNTER — TELEPHONE (OUTPATIENT)
Dept: PERINATAL CARE | Facility: CLINIC | Age: 30
End: 2020-11-17

## 2020-11-18 ENCOUNTER — ROUTINE PRENATAL (OUTPATIENT)
Dept: PERINATAL CARE | Facility: OTHER | Age: 30
End: 2020-11-18

## 2020-11-18 ENCOUNTER — LAB (OUTPATIENT)
Dept: LAB | Facility: CLINIC | Age: 30
End: 2020-11-18

## 2020-11-18 VITALS
DIASTOLIC BLOOD PRESSURE: 70 MMHG | TEMPERATURE: 97.5 F | HEIGHT: 61 IN | WEIGHT: 193.8 LBS | BODY MASS INDEX: 36.59 KG/M2 | HEART RATE: 80 BPM | SYSTOLIC BLOOD PRESSURE: 127 MMHG

## 2020-11-18 DIAGNOSIS — Z3A.22 22 WEEKS GESTATION OF PREGNANCY: ICD-10-CM

## 2020-11-18 DIAGNOSIS — Z36.86 ENCOUNTER FOR ANTENATAL SCREENING FOR CERVICAL LENGTH: ICD-10-CM

## 2020-11-18 DIAGNOSIS — O10.912 MATERNAL CHRONIC HYPERTENSION, SECOND TRIMESTER: Primary | ICD-10-CM

## 2020-11-18 DIAGNOSIS — O24.414 INSULIN CONTROLLED GESTATIONAL DIABETES MELLITUS (GDM) IN SECOND TRIMESTER: ICD-10-CM

## 2020-11-18 DIAGNOSIS — Z34.92 SECOND TRIMESTER PREGNANCY: ICD-10-CM

## 2020-11-18 DIAGNOSIS — O99.212 OBESITY AFFECTING PREGNANCY IN SECOND TRIMESTER: ICD-10-CM

## 2020-11-18 PROCEDURE — 82105 ALPHA-FETOPROTEIN SERUM: CPT

## 2020-11-18 PROCEDURE — 76817 TRANSVAGINAL US OBSTETRIC: CPT | Performed by: OBSTETRICS & GYNECOLOGY

## 2020-11-18 PROCEDURE — 99212 OFFICE O/P EST SF 10 MIN: CPT | Performed by: OBSTETRICS & GYNECOLOGY

## 2020-11-18 PROCEDURE — 84702 CHORIONIC GONADOTROPIN TEST: CPT

## 2020-11-18 PROCEDURE — 82677 ASSAY OF ESTRIOL: CPT

## 2020-11-18 PROCEDURE — 86336 INHIBIN A: CPT

## 2020-11-18 PROCEDURE — 76811 OB US DETAILED SNGL FETUS: CPT | Performed by: OBSTETRICS & GYNECOLOGY

## 2020-11-19 DIAGNOSIS — O24.410 DIET CONTROLLED GESTATIONAL DIABETES MELLITUS (GDM) IN SECOND TRIMESTER: ICD-10-CM

## 2020-11-19 DIAGNOSIS — Z3A.16 16 WEEKS GESTATION OF PREGNANCY: ICD-10-CM

## 2020-11-19 RX ORDER — LANCETS
EACH MISCELLANEOUS
Qty: 102 EACH | Refills: 5 | Status: SHIPPED | OUTPATIENT
Start: 2020-11-19

## 2020-11-19 RX ORDER — BLOOD SUGAR DIAGNOSTIC
STRIP MISCELLANEOUS
Qty: 150 EACH | Refills: 5 | Status: SHIPPED | OUTPATIENT
Start: 2020-11-19

## 2020-11-24 LAB
2ND TRIMESTER 4 SCREEN SERPL-IMP: NORMAL
2ND TRIMESTER 4 SCREEN SERPL-IMP: NORMAL
AFP ADJ MOM SERPL: 1.39
AFP SERPL-MCNC: 89.8 NG/ML
AGE AT DELIVERY: 31.1 YR
FET TS 18 RISK FROM MAT AGE: NORMAL
FET TS 21 RISK FROM MAT AGE: 604
GA METHOD: NORMAL
GA: 22.1 WEEKS
HCG ADJ MOM SERPL: NORMAL
HCG SERPL-ACNC: 8733 MIU/ML
IDDM PATIENT QL: NO
INHIBIN A ADJ MOM SERPL: NORMAL
INHIBIN A SERPL-MCNC: 153.19 PG/ML
KARYOTYP BLD/T: NORMAL
MULTIPLE PREGNANCY: NO
NEURAL TUBE DEFECT RISK FETUS: 3704 %
SERVICE CMNT-IMP: NORMAL
TS 18 RISK FETUS: NORMAL
TS 21 RISK FETUS: NORMAL
U ESTRIOL ADJ MOM SERPL: NORMAL
U ESTRIOL SERPL-MCNC: 3.29 NG/ML

## 2020-11-25 ENCOUNTER — DOCUMENTATION (OUTPATIENT)
Dept: PERINATAL CARE | Facility: CLINIC | Age: 30
End: 2020-11-25

## 2020-11-25 DIAGNOSIS — Z3A.23 23 WEEKS GESTATION OF PREGNANCY: ICD-10-CM

## 2020-11-25 DIAGNOSIS — O24.414 INSULIN CONTROLLED GESTATIONAL DIABETES MELLITUS (GDM) IN SECOND TRIMESTER: Primary | ICD-10-CM

## 2020-12-01 ENCOUNTER — ROUTINE PRENATAL (OUTPATIENT)
Dept: OBGYN CLINIC | Facility: CLINIC | Age: 30
End: 2020-12-01

## 2020-12-01 VITALS
DIASTOLIC BLOOD PRESSURE: 69 MMHG | BODY MASS INDEX: 37.19 KG/M2 | WEIGHT: 197 LBS | HEART RATE: 92 BPM | HEIGHT: 61 IN | SYSTOLIC BLOOD PRESSURE: 103 MMHG

## 2020-12-01 DIAGNOSIS — Z3A.24 24 WEEKS GESTATION OF PREGNANCY: Primary | ICD-10-CM

## 2020-12-01 PROCEDURE — 99213 OFFICE O/P EST LOW 20 MIN: CPT | Performed by: FAMILY MEDICINE

## 2020-12-02 ENCOUNTER — DOCUMENTATION (OUTPATIENT)
Dept: PERINATAL CARE | Facility: CLINIC | Age: 30
End: 2020-12-02

## 2020-12-02 DIAGNOSIS — E03.8 SUBCLINICAL HYPOTHYROIDISM: ICD-10-CM

## 2020-12-02 DIAGNOSIS — Z3A.24 24 WEEKS GESTATION OF PREGNANCY: ICD-10-CM

## 2020-12-02 DIAGNOSIS — O24.414 INSULIN CONTROLLED GESTATIONAL DIABETES MELLITUS (GDM) IN SECOND TRIMESTER: Primary | ICD-10-CM

## 2020-12-05 ENCOUNTER — LAB (OUTPATIENT)
Dept: LAB | Facility: CLINIC | Age: 30
End: 2020-12-05

## 2020-12-05 DIAGNOSIS — O24.414 INSULIN CONTROLLED GESTATIONAL DIABETES MELLITUS (GDM) IN SECOND TRIMESTER: ICD-10-CM

## 2020-12-05 DIAGNOSIS — Z3A.24 24 WEEKS GESTATION OF PREGNANCY: ICD-10-CM

## 2020-12-05 DIAGNOSIS — Z3A.23 23 WEEKS GESTATION OF PREGNANCY: ICD-10-CM

## 2020-12-05 LAB
BASOPHILS # BLD AUTO: 0.02 THOUSANDS/ΜL (ref 0–0.1)
BASOPHILS NFR BLD AUTO: 0 % (ref 0–1)
EOSINOPHIL # BLD AUTO: 0.04 THOUSAND/ΜL (ref 0–0.61)
EOSINOPHIL NFR BLD AUTO: 0 % (ref 0–6)
ERYTHROCYTE [DISTWIDTH] IN BLOOD BY AUTOMATED COUNT: 14.2 % (ref 11.6–15.1)
EST. AVERAGE GLUCOSE BLD GHB EST-MCNC: 108 MG/DL
HBA1C MFR BLD: 5.4 %
HCT VFR BLD AUTO: 36.7 % (ref 34.8–46.1)
HGB BLD-MCNC: 11.7 G/DL (ref 11.5–15.4)
IMM GRANULOCYTES # BLD AUTO: 0.08 THOUSAND/UL (ref 0–0.2)
IMM GRANULOCYTES NFR BLD AUTO: 1 % (ref 0–2)
LYMPHOCYTES # BLD AUTO: 1.74 THOUSANDS/ΜL (ref 0.6–4.47)
LYMPHOCYTES NFR BLD AUTO: 18 % (ref 14–44)
MCH RBC QN AUTO: 26.8 PG (ref 26.8–34.3)
MCHC RBC AUTO-ENTMCNC: 31.9 G/DL (ref 31.4–37.4)
MCV RBC AUTO: 84 FL (ref 82–98)
MONOCYTES # BLD AUTO: 0.43 THOUSAND/ΜL (ref 0.17–1.22)
MONOCYTES NFR BLD AUTO: 4 % (ref 4–12)
NEUTROPHILS # BLD AUTO: 7.41 THOUSANDS/ΜL (ref 1.85–7.62)
NEUTS SEG NFR BLD AUTO: 77 % (ref 43–75)
NRBC BLD AUTO-RTO: 0 /100 WBCS
PLATELET # BLD AUTO: 352 THOUSANDS/UL (ref 149–390)
PMV BLD AUTO: 10 FL (ref 8.9–12.7)
RBC # BLD AUTO: 4.37 MILLION/UL (ref 3.81–5.12)
WBC # BLD AUTO: 9.72 THOUSAND/UL (ref 4.31–10.16)

## 2020-12-05 PROCEDURE — 86592 SYPHILIS TEST NON-TREP QUAL: CPT

## 2020-12-05 PROCEDURE — 83036 HEMOGLOBIN GLYCOSYLATED A1C: CPT

## 2020-12-05 PROCEDURE — 36415 COLL VENOUS BLD VENIPUNCTURE: CPT

## 2020-12-05 PROCEDURE — 85025 COMPLETE CBC W/AUTO DIFF WBC: CPT

## 2020-12-06 LAB — RPR SER QL: NORMAL

## 2020-12-11 ENCOUNTER — DOCUMENTATION (OUTPATIENT)
Dept: PERINATAL CARE | Facility: CLINIC | Age: 30
End: 2020-12-11

## 2020-12-11 DIAGNOSIS — O99.212 OBESITY AFFECTING PREGNANCY IN SECOND TRIMESTER: ICD-10-CM

## 2020-12-11 DIAGNOSIS — O24.414 INSULIN CONTROLLED GESTATIONAL DIABETES MELLITUS (GDM) IN SECOND TRIMESTER: Primary | ICD-10-CM

## 2020-12-11 DIAGNOSIS — Z3A.25 25 WEEKS GESTATION OF PREGNANCY: ICD-10-CM

## 2020-12-14 ENCOUNTER — LAB (OUTPATIENT)
Dept: LAB | Facility: CLINIC | Age: 30
End: 2020-12-14

## 2020-12-14 PROBLEM — Z3A.25 25 WEEKS GESTATION OF PREGNANCY: Status: ACTIVE | Noted: 2020-11-03

## 2020-12-14 PROBLEM — R73.09 ELEVATED HEMOGLOBIN A1C: Status: RESOLVED | Noted: 2019-05-14 | Resolved: 2020-12-14

## 2020-12-14 LAB — TSH SERPL DL<=0.05 MIU/L-ACNC: 1.16 UIU/ML (ref 0.36–3.74)

## 2020-12-14 PROCEDURE — 36415 COLL VENOUS BLD VENIPUNCTURE: CPT | Performed by: OBSTETRICS & GYNECOLOGY

## 2020-12-14 PROCEDURE — 84443 ASSAY THYROID STIM HORMONE: CPT | Performed by: OBSTETRICS & GYNECOLOGY

## 2020-12-16 ENCOUNTER — TELEPHONE (OUTPATIENT)
Dept: PERINATAL CARE | Facility: CLINIC | Age: 30
End: 2020-12-16

## 2020-12-22 ENCOUNTER — DOCUMENTATION (OUTPATIENT)
Dept: PERINATAL CARE | Facility: CLINIC | Age: 30
End: 2020-12-22

## 2020-12-22 ENCOUNTER — TELEPHONE (OUTPATIENT)
Dept: PERINATAL CARE | Facility: CLINIC | Age: 30
End: 2020-12-22

## 2020-12-22 DIAGNOSIS — O99.212 OBESITY AFFECTING PREGNANCY IN SECOND TRIMESTER: ICD-10-CM

## 2020-12-22 DIAGNOSIS — O24.414 INSULIN CONTROLLED GESTATIONAL DIABETES MELLITUS (GDM) IN SECOND TRIMESTER: Primary | ICD-10-CM

## 2020-12-22 DIAGNOSIS — Z3A.27 27 WEEKS GESTATION OF PREGNANCY: ICD-10-CM

## 2020-12-28 PROBLEM — O10.913 MATERNAL CHRONIC HYPERTENSION IN THIRD TRIMESTER: Status: ACTIVE | Noted: 2020-11-18

## 2020-12-28 PROBLEM — Z3A.28 28 WEEKS GESTATION OF PREGNANCY: Status: ACTIVE | Noted: 2020-12-28

## 2020-12-29 ENCOUNTER — ROUTINE PRENATAL (OUTPATIENT)
Dept: OBGYN CLINIC | Facility: CLINIC | Age: 30
End: 2020-12-29

## 2020-12-29 VITALS
WEIGHT: 200 LBS | DIASTOLIC BLOOD PRESSURE: 78 MMHG | SYSTOLIC BLOOD PRESSURE: 111 MMHG | HEART RATE: 94 BPM | HEIGHT: 61 IN | BODY MASS INDEX: 37.76 KG/M2

## 2020-12-29 DIAGNOSIS — E03.8 SUBCLINICAL HYPOTHYROIDISM: ICD-10-CM

## 2020-12-29 DIAGNOSIS — Z3A.28 28 WEEKS GESTATION OF PREGNANCY: ICD-10-CM

## 2020-12-29 DIAGNOSIS — O24.414 INSULIN CONTROLLED GESTATIONAL DIABETES MELLITUS (GDM) IN THIRD TRIMESTER: ICD-10-CM

## 2020-12-29 DIAGNOSIS — O10.913 MATERNAL CHRONIC HYPERTENSION IN THIRD TRIMESTER: Primary | ICD-10-CM

## 2020-12-29 PROBLEM — O36.8390 FETAL BRADYCARDIA, ANTEPARTUM CONDITION OR COMPLICATION: Status: RESOLVED | Noted: 2020-10-06 | Resolved: 2020-12-29

## 2020-12-29 PROBLEM — O99.213 OBESITY AFFECTING PREGNANCY IN THIRD TRIMESTER: Status: ACTIVE | Noted: 2020-10-07

## 2020-12-29 PROCEDURE — 99213 OFFICE O/P EST LOW 20 MIN: CPT | Performed by: NURSE PRACTITIONER

## 2020-12-30 ENCOUNTER — ULTRASOUND (OUTPATIENT)
Dept: PERINATAL CARE | Facility: OTHER | Age: 30
End: 2020-12-30

## 2020-12-30 VITALS
HEART RATE: 92 BPM | BODY MASS INDEX: 37.61 KG/M2 | SYSTOLIC BLOOD PRESSURE: 107 MMHG | DIASTOLIC BLOOD PRESSURE: 74 MMHG | HEIGHT: 61 IN | WEIGHT: 199.2 LBS

## 2020-12-30 DIAGNOSIS — O24.414 INSULIN CONTROLLED GESTATIONAL DIABETES MELLITUS (GDM) IN THIRD TRIMESTER: ICD-10-CM

## 2020-12-30 DIAGNOSIS — O10.913 MATERNAL CHRONIC HYPERTENSION IN THIRD TRIMESTER: Primary | ICD-10-CM

## 2020-12-30 DIAGNOSIS — O99.213 OBESITY AFFECTING PREGNANCY IN THIRD TRIMESTER: ICD-10-CM

## 2020-12-30 DIAGNOSIS — Z3A.28 28 WEEKS GESTATION OF PREGNANCY: ICD-10-CM

## 2020-12-30 PROCEDURE — 76816 OB US FOLLOW-UP PER FETUS: CPT | Performed by: OBSTETRICS & GYNECOLOGY

## 2020-12-30 PROCEDURE — 99212 OFFICE O/P EST SF 10 MIN: CPT | Performed by: OBSTETRICS & GYNECOLOGY

## 2020-12-31 ENCOUNTER — DOCUMENTATION (OUTPATIENT)
Dept: PERINATAL CARE | Facility: CLINIC | Age: 30
End: 2020-12-31

## 2021-01-06 ENCOUNTER — DOCUMENTATION (OUTPATIENT)
Dept: PERINATAL CARE | Facility: CLINIC | Age: 31
End: 2021-01-06

## 2021-01-06 DIAGNOSIS — Z3A.29 29 WEEKS GESTATION OF PREGNANCY: ICD-10-CM

## 2021-01-06 DIAGNOSIS — O99.213 OBESITY AFFECTING PREGNANCY IN THIRD TRIMESTER: ICD-10-CM

## 2021-01-06 DIAGNOSIS — O24.414 INSULIN CONTROLLED GESTATIONAL DIABETES MELLITUS (GDM) IN THIRD TRIMESTER: Primary | ICD-10-CM

## 2021-01-06 NOTE — PROGRESS NOTES
Date:  21  RE: Sallyanne Ledger EspinozaReyes    : 1990  NEENA: Estimated Date of Delivery: 3/23/21  EGA: 29w1d  Insulin controlled gestational diabetes; Turkmen Speaking only; needs an interpretor  Date   Fasting Post-  breakfast Post-  lunch Post-  dinner Before bedtime Comments   20   85 102     20 90 120 92 115     20 90 89 83 100     21 81 120 99 106  1 hour PP   21 88 96 124 115     1/3/21 82 115 102 130     21 90 108 101 121     21 85 115                                                               VIA PHONE Turkmen speaking  Current regimen:  Relion NPH- 20 units at 9 or 10 pm   2000 calorie GDM diet with 3 meals and 3 snacks including  Now eating her 3 snacks, but missing protein at all 3 snacks  Admits to eating an extra tortilla when had a high after lunch result  Self monitoring blood glucose fasting and 1 hour post meals with an Accu-Chek Guide glucose meter  Plan:  ReliOn NPH insulin - continue 20 units at 9-10 PM    Continue GDM diet, 1 hour post meal glucose check starting 21 ultrasound fetal growth appeared normal and MARY normal   20 BeD8o-1 4%, decreased from 5 7% in 1 month  Date due to report next:  Monday, 21 or sooner if needed         [facility] Maternal Fetal Medicine  Diabetes and Pregnancy Program

## 2021-01-12 ENCOUNTER — ROUTINE PRENATAL (OUTPATIENT)
Dept: OBGYN CLINIC | Facility: CLINIC | Age: 31
End: 2021-01-12

## 2021-01-12 ENCOUNTER — DOCUMENTATION (OUTPATIENT)
Dept: PERINATAL CARE | Facility: CLINIC | Age: 31
End: 2021-01-12

## 2021-01-12 VITALS
WEIGHT: 200 LBS | HEIGHT: 61 IN | BODY MASS INDEX: 37.76 KG/M2 | DIASTOLIC BLOOD PRESSURE: 80 MMHG | HEART RATE: 91 BPM | SYSTOLIC BLOOD PRESSURE: 124 MMHG

## 2021-01-12 DIAGNOSIS — Z3A.30 30 WEEKS GESTATION OF PREGNANCY: ICD-10-CM

## 2021-01-12 DIAGNOSIS — Z23 NEED FOR TDAP VACCINATION: ICD-10-CM

## 2021-01-12 DIAGNOSIS — O24.414 INSULIN CONTROLLED GESTATIONAL DIABETES MELLITUS (GDM) IN THIRD TRIMESTER: Primary | ICD-10-CM

## 2021-01-12 DIAGNOSIS — Z3A.30 30 WEEKS GESTATION OF PREGNANCY: Primary | ICD-10-CM

## 2021-01-12 PROCEDURE — 90471 IMMUNIZATION ADMIN: CPT | Performed by: FAMILY MEDICINE

## 2021-01-12 PROCEDURE — 99213 OFFICE O/P EST LOW 20 MIN: CPT | Performed by: FAMILY MEDICINE

## 2021-01-12 PROCEDURE — 90715 TDAP VACCINE 7 YRS/> IM: CPT | Performed by: FAMILY MEDICINE

## 2021-01-12 NOTE — PROGRESS NOTES
Ivette Aguiar is a 27y o  year old  with PMHx of Chronic HTN, Hypothyroidism and A2GDM at 30w0d who presents for routine prenatal visit  Vitals:    21 1606   BP: 124/80   Pulse: 91        Denies VB, LOF, CTX, cramping   Reports feeling fetal movement    Tdap needed Yes - Given at today's visit   [address]   Patient is due for growth scan on 2021  She will begin NSTs/AFIs at this time   Patient will continue on Labetalol for her HTN, Aspirin for preeclampsia prophylaxis and Insulin for A2GM (10units NPH qAM and 20units qHS)   Follow up in 2 weeks   Continue PNV QD    Discussed with Dr Prudence Jones MD  21

## 2021-01-12 NOTE — PROGRESS NOTES
Date:  21  RE: Jami DelgadomikezaReyes    : 1990  NEENA: Estimated Date of Delivery: 3/23/21  EGA: 30w0d  Insulin controlled gestational diabetes; Icelandic Speaking only; needs an interpretor  Date   Fasting Post-  breakfast Post-  lunch Post-  dinner Before bedtime Comments    85 115 115 121      86 127 118 114      90 98 113 112      90 103 124 124      86 133 124 130     1/10 85 115 135 124      84 91 133                          VIA PHONE Icelandic speaking  Current regimen:  Relion NPH- 20 units at 9 or 10 pm    calorie GDM diet with 3 meals and 3 snacks including  Now eating her 3 snacks, but missing protein at all 3 snacks  Admits to eating an extra tortilla when had a high after lunch result  Self monitoring blood glucose fasting and 1 hour post meals with an Accu-Chek Guide glucose meter  Plan:  ReliOn NPH insulin - add 10 units at 7 AM and continue 20 units at 9 PM    Continue GDM diet and SMBG  20 ultrasound fetal growth appeared normal and MARY normal   20 PwB1h-7 4%, decreased from 5 7% in 1 month  Date due to report next:  Monday, 21 or sooner if needed       [facility] Maternal Fetal Medicine  Diabetes and Pregnancy Program

## 2021-01-14 ENCOUNTER — TELEPHONE (OUTPATIENT)
Dept: OBGYN CLINIC | Facility: CLINIC | Age: 31
End: 2021-01-14

## 2021-01-14 NOTE — TELEPHONE ENCOUNTER
----- Message from Jana Deshpande MD sent at 1/12/2021  4:59 PM EST -----  Regarding: BP monitoring device  Patient who has chronic hypertension and is on Labetalol reports not having a BP monitoring device at home  She is self-pay  Are there any options for her to get a home BP monitoring device? Patient was advised to call this Thursday to discuss with her financial issues with the        Jana Deshpande MD   PGY2- Family Medicine Resident   01/12/21

## 2021-01-14 NOTE — TELEPHONE ENCOUNTER
Spoke with patient using  line  Patient notified that we have a blood pressure cuff in our office that she can borrow to do blood pressure checks at home  Patient advised she can pick it up at her convenience

## 2021-01-19 ENCOUNTER — DOCUMENTATION (OUTPATIENT)
Dept: PERINATAL CARE | Facility: CLINIC | Age: 31
End: 2021-01-19

## 2021-01-19 DIAGNOSIS — O24.414 INSULIN CONTROLLED GESTATIONAL DIABETES MELLITUS (GDM) IN SECOND TRIMESTER: ICD-10-CM

## 2021-01-19 DIAGNOSIS — O24.414 INSULIN CONTROLLED GESTATIONAL DIABETES MELLITUS (GDM) IN THIRD TRIMESTER: Primary | ICD-10-CM

## 2021-01-19 RX ORDER — BLOOD SUGAR DIAGNOSTIC
STRIP MISCELLANEOUS
Qty: 100 EACH | Refills: 2 | Status: SHIPPED | OUTPATIENT
Start: 2021-01-19 | End: 2021-06-09

## 2021-01-19 NOTE — PROGRESS NOTES
Date:  21  RE: Luwanna Coil EspinozaReyes    : 1990  NEENA: Estimated Date of Delivery: 3/23/21  EGA: 31w0d  Insulin controlled gestational diabetes; Citizen of [place] Speaking only; needs an interpretor  Date   Fasting Post-  breakfast Post-  lunch Post-  dinner Before bedtime Comments   21    125      84 89 101 119      90 115 126 116      87 81 110 103     1/15 86 118 110 126      84 112 114 128      90 115 119 116      89 117 86 116      84          VIA PHONE Citizen of [place] speaking  Current regimen:  Relion NPH- 10 units at 7 AM and 20 units at 9 or 10 pm   2000 calorie GDM diet with 3 meals and 3 snacks including  Now eating her 3 snacks, but missing protein at all 3 snacks  Admits to eating an extra tortilla when had a high after lunch result  Self monitoring blood glucose fasting and 1 hour post meals with an Accu-Chek Guide glucose meter  Plan:  ReliOn NPH insulin - increase 12 units at 7 AM and increase 22 units at 9 PM    Continue GDM diet and SMBG  20 ultrasound fetal growth appeared normal and MARY normal   20 QyR1t-0 4%  Starting at 32 weeks gestation NST twice a week and MARY weekly  Date due to report next:  Monday, 21 or sooner if needed       [facility] Maternal Fetal Medicine  Diabetes and Pregnancy Program

## 2021-01-26 ENCOUNTER — TELEPHONE (OUTPATIENT)
Dept: PERINATAL CARE | Facility: OTHER | Age: 31
End: 2021-01-26

## 2021-01-26 NOTE — TELEPHONE ENCOUNTER
Spoke with patient and confirmed appointment with [facility]  1 support person ( must be over age of 15) may accompany patient  Will you and your support person be able to wear a mask ,without a valve , during entire appointment? YES   To minimize your exposure in our waiting area,check in and rooming questions will be done via phone  When you arrive in the parking lot please call the following inside line # prior to entering office:    Chance Reynolds: [phone]    Have you or your support person traveled outside the state in the last 2 weeks? NO  If yes, what state did you travel to? Do you or your support person have:  Fever or flu- like symptoms? NO  Symptoms of upper respiratory infection like runny nose, sore throat or cough? NO  Do you have new headache that you have not had in the past?NO  Have you experienced any new shortness of breath recently? NO  Do you have any new loss of taste or smell? NO  Do you have any new diarrhea, nausea or vomiting? NO  Have you recently been in contact with anyone who has been sick or diagnosed with COVID-19 infection? NO  Have you been recommended to quarantine because of an exposure to a confirmed positive COVID19 person? NO  Have you recently been tested for COVID19? NO    Patient verbalized understanding of all instructions  Danish language line used   Interpretor # [id number]    -------------------------------------------------------------

## 2021-01-27 ENCOUNTER — DOCUMENTATION (OUTPATIENT)
Dept: PERINATAL CARE | Facility: CLINIC | Age: 31
End: 2021-01-27

## 2021-01-27 ENCOUNTER — ULTRASOUND (OUTPATIENT)
Dept: PERINATAL CARE | Facility: OTHER | Age: 31
End: 2021-01-27

## 2021-01-27 ENCOUNTER — ROUTINE PRENATAL (OUTPATIENT)
Dept: PERINATAL CARE | Facility: OTHER | Age: 31
End: 2021-01-27

## 2021-01-27 VITALS
SYSTOLIC BLOOD PRESSURE: 99 MMHG | HEIGHT: 61 IN | DIASTOLIC BLOOD PRESSURE: 67 MMHG | HEART RATE: 90 BPM | BODY MASS INDEX: 39.13 KG/M2 | WEIGHT: 207.23 LBS

## 2021-01-27 DIAGNOSIS — O24.414 INSULIN CONTROLLED GESTATIONAL DIABETES MELLITUS (GDM) IN THIRD TRIMESTER: ICD-10-CM

## 2021-01-27 DIAGNOSIS — Z3A.32 32 WEEKS GESTATION OF PREGNANCY: Primary | ICD-10-CM

## 2021-01-27 DIAGNOSIS — Z33.1 PREGNANT STATE, INCIDENTAL: Primary | ICD-10-CM

## 2021-01-27 DIAGNOSIS — O24.414 INSULIN CONTROLLED GESTATIONAL DIABETES MELLITUS (GDM) IN THIRD TRIMESTER: Primary | ICD-10-CM

## 2021-01-27 PROCEDURE — 59025 FETAL NON-STRESS TEST: CPT | Performed by: OBSTETRICS & GYNECOLOGY

## 2021-01-27 PROCEDURE — NC001 PR NO CHARGE

## 2021-01-27 PROCEDURE — 76816 OB US FOLLOW-UP PER FETUS: CPT | Performed by: OBSTETRICS & GYNECOLOGY

## 2021-01-27 PROCEDURE — 99213 OFFICE O/P EST LOW 20 MIN: CPT | Performed by: OBSTETRICS & GYNECOLOGY

## 2021-01-27 NOTE — PROGRESS NOTES
Date:  21  RE: Paticia Reasoner EspinozaReyes    : 1990  NEENA: Estimated Date of Delivery: 3/23/21  EGA: 32w1d  Insulin controlled gestational diabetes; Yemeni Speaking only; needs an interpretor  Date   Fasting Post-  breakfast Post-  lunch Post-  dinner Before bedtime Comments   21 84 115 130 129     21 76 114 130 118     21 90 121 125 116     21 82 106 111 99     21 82 135 79 129     21 90 115 127 130     21 90 118 104 122                         VIA PHONE Yemeni speaking  Current regimen:  Relion NPH- 12 units at 7 AM and 22 units at 9 or 10 pm   2000 calorie GDM diet with 3 meals and 3 snacks including  Now eating her 3 snacks, but missing protein at all 3 snacks  Admits to eating an extra tortilla when had a high after lunch result  Self monitoring blood glucose fasting and 1 hour post meals with an Accu-Chek Guide glucose meter  Plan:  ReliOn NPH insulin - 12 units at 7 AM and increase 26 units at 9 PM    Continue GDM diet and SMBG  20 ultrasound fetal growth appeared normal and MARY normal   20 XjM2k-3 4%  Starting at 32 weeks gestation NST twice a week and MARY weekly  Date due to report next:  Monday, 21 or sooner if needed       [facility] Maternal Fetal Medicine  Diabetes and Pregnancy Program

## 2021-01-27 NOTE — PATIENT INSTRUCTIONS
Conteo de patadas en el embarazo   CUIDADO AMBULATORIO:   El conteo de patadas mide cuánto se está moviendo wheatley bebé en el útero  Nadia patada de wheatley bebé podría sentirse boni nadia torcedura, nadia vuelta, un crujido, un meneo o un golpe  Es común sentir a tu bebé patear a las 32 a 29 semanas de [place]  Es posible que sienta al bebé patear ya a las 20 semanas de [place]  Puede que desee empezar a contar a las 28 semanas  Comuníquese inmediatamente con wheatley médico si:  · Usted siente un cambio en el número de patadas o movimientos de wheatley bebé  · Siente menos de 10 patadas en 2 horas  · Usted tiene preguntas o inquietudes acerca de los movimientos de wheatley bebé  Por qué realizar el conteo de patadas: Los movimientos de wheatley bebé podrían proporcionar información de la romel de wheatley bebé  Es posible que si hay problemas, wheatley bebé se mueva menos o nada en lo absoluto  El bebé podría moverse menos si no recibe suficiente oxígeno o alimento de la placenta  No fume mientras está embarazada  Fumar disminuye la cantidad de oxígeno que llega a wheatley bebé  Hable con wheatley médico si necesita ayuda para dejar de fumar  Los problemas que se encuentran en nadia etapa más temprana son más fáciles de tratar  Cuándo realizar el conteo de patadas:  · Cuente las patadas en el mismo horario todos los GRASSE  · Realice el conteo de las patadas cuando wheatley bebé esté despierto y [place]  Wheatley bebé podría estar más activo en la tarde  Cómo realizar el conteo de patadas: Revise que wheatley bebé esté despierto antes de realizar el conteo de patadas  Usted puede despertar a wheatley bebé empujando wheatley estómago suavemente, caminando o tomando algo frío  Wheatley médico podría indicarle diferentes maneras de realizar el conteo  Es posible que le indique que jori lo siguiente:  · Use nadia gráfica o un reloj para mantener un registro de la hora en que comienza y termina de contar  · Siéntese en nadia silla o acuéstese en wheatley costado derecho      · Coloque saba [place] en la parte más solomon de wheatley estómago  · Cuente hasta que llegue a las 10 patadas  Escriba cuánto tiempo le lleva contar las 10 patadas  · Podría merly de 30 minutos a 2 horas para contar 10 patadas  No debería de merly más de 2 horas para contar 10 patadas  Acuda a saba consultas de control con wheatley médico según le indicaron  Anote saba preguntas para que se acuerde de hacerlas jessica saba visitas  © Copyright [employer] is for End User's use only and may not be sold, redistributed or otherwise used for commercial purposes  All illustrations and images included in CareNotes® are the copyrighted property of [employer]  or [address] [place] [address] es sólo para uso en educación  Wheatley intención no es darle un consejo médico sobre enfermedades o tratamientos  Colsulte con wheatley Olivia Clipper farmacéutico antes de seguir cualquier régimen médico para saber si es seguro y efectivo para usted

## 2021-01-27 NOTE — LETTER
NST sleeve cover sheet    Patient name: Adolfo Cortez  : 1990  MRN: [id number]    NEENA: Estimated Date of Delivery: 3/23/21    Obstetrician: __[facility]    Reason(s) for testing: A2 GDM, CHTN  __________________________________________      Testing frequency:    _x__ 2x/wk  ___ 1x/wk  ___ Dopplers  ___ BPP?       Last growth scan: __________________________________________

## 2021-01-28 ENCOUNTER — ROUTINE PRENATAL (OUTPATIENT)
Dept: OBGYN CLINIC | Facility: CLINIC | Age: 31
End: 2021-01-28

## 2021-01-28 VITALS
BODY MASS INDEX: 37.76 KG/M2 | HEART RATE: 88 BPM | WEIGHT: 200 LBS | SYSTOLIC BLOOD PRESSURE: 128 MMHG | HEIGHT: 61 IN | DIASTOLIC BLOOD PRESSURE: 84 MMHG

## 2021-01-28 DIAGNOSIS — Z34.93 PRENATAL CARE, THIRD TRIMESTER: Primary | ICD-10-CM

## 2021-01-28 DIAGNOSIS — O09.93 PREGNANCY, SUPERVISION, HIGH-RISK, THIRD TRIMESTER: ICD-10-CM

## 2021-01-28 PROCEDURE — 99213 OFFICE O/P EST LOW 20 MIN: CPT | Performed by: OBSTETRICS & GYNECOLOGY

## 2021-01-28 NOTE — LETTER
January 28, 2021     Patient: Eugenio Ayers   YOB: 1990   Date of Visit: 1/28/2021       To Whom It May Concern: It is my medical opinion that Ms EspinozaReyes is able to fly prior to 36 weeks gestation  Her due date is 3/23/2021  She will be carrying the following medications on the plane, some of which require refrigeration/to be carried in a cooler:    Aspirin 81 mg  Insulin NPH  Injection supplies including lancets, syringes, and needles  Levothyroxine 25 mcg  Prenatal vitamin    If you have any questions or concerns, please don't hesitate to call           Sincerely,        Hesham Koroma MD        CC: No Recipients

## 2021-01-28 NOTE — PROGRESS NOTES
OB/GYN prenatal visit    S: 32 y o   32w2d here for PN visit  She has no obstetric complaints, including pelvic pain, contractions, vaginal bleeding, loss of fluid, or decreased fetal movement  Discussed needing prenatal records for new doctor, for safe travel with insulin, and to feel free to continue to call in blood pressures and sugars to [facility] until established in [place]   [id number] used for Belarusian interpretation      O:  Vitals:    21 1606   BP: 128/84   Pulse: 88       Gen: no acute distress, nonlabored breathing   Abd: soft, nontender, fundal height 32 cm  FHT: 140       A/P:      IUP at 32w2d  No obstetric complaints today  TWG: + 10 lbs (recommended weight gain 20)  Flu vaccine 10/6/20, TDAP vaccine 21  Contraception: OCP  Breastfeeding: yes  Discussed  labor precautions and fetal kick counts  Return to clinic in 2 weeks      COVID 19 precautions were discussed with patient at length, reviewed symptoms, hygiene, social distancing, patient to call office  with Elis Toussaint MD  2021  4:35 PM

## 2021-04-19 DIAGNOSIS — O99.211 OBESITY AFFECTING PREGNANCY IN FIRST TRIMESTER: ICD-10-CM

## 2021-04-19 DIAGNOSIS — Z3A.13 13 WEEKS GESTATION OF PREGNANCY: ICD-10-CM

## 2021-04-19 RX ORDER — LORATADINE 10 MG
TABLET ORAL
Qty: 180 TABLET | Refills: 2 | OUTPATIENT
Start: 2021-04-19